# Patient Record
Sex: FEMALE | Race: WHITE | Employment: FULL TIME | URBAN - METROPOLITAN AREA
[De-identification: names, ages, dates, MRNs, and addresses within clinical notes are randomized per-mention and may not be internally consistent; named-entity substitution may affect disease eponyms.]

---

## 2016-12-06 LAB
HBSAG, EXTERNAL: NORMAL
HIV, EXTERNAL: NORMAL
RPR, EXTERNAL: NORMAL
RUBELLA, EXTERNAL: NORMAL
TYPE, ABO & RH, EXTERNAL: NORMAL

## 2017-06-15 LAB — GRBS, EXTERNAL: NEGATIVE

## 2017-06-22 ENCOUNTER — HOSPITAL ENCOUNTER (OUTPATIENT)
Age: 32
Discharge: HOME | End: 2017-06-22
Attending: OBSTETRICS & GYNECOLOGY | Admitting: OBSTETRICS & GYNECOLOGY
Payer: COMMERCIAL

## 2017-06-22 VITALS — SYSTOLIC BLOOD PRESSURE: 107 MMHG | DIASTOLIC BLOOD PRESSURE: 69 MMHG | TEMPERATURE: 98.5 F | HEART RATE: 77 BPM

## 2017-06-22 PROCEDURE — 74011250636 HC RX REV CODE- 250/636: Performed by: OBSTETRICS & GYNECOLOGY

## 2017-06-22 PROCEDURE — 77030020255 HC SOL INJ LR 1000ML BG

## 2017-06-22 PROCEDURE — 59412 ANTEPARTUM MANIPULATION: CPT

## 2017-06-22 PROCEDURE — 96360 HYDRATION IV INFUSION INIT: CPT

## 2017-06-22 PROCEDURE — 99285 EMERGENCY DEPT VISIT HI MDM: CPT

## 2017-06-22 PROCEDURE — 76815 OB US LIMITED FETUS(S): CPT

## 2017-06-22 PROCEDURE — 59025 FETAL NON-STRESS TEST: CPT

## 2017-06-22 PROCEDURE — 96361 HYDRATE IV INFUSION ADD-ON: CPT

## 2017-06-22 RX ORDER — SODIUM CHLORIDE, SODIUM LACTATE, POTASSIUM CHLORIDE, CALCIUM CHLORIDE 600; 310; 30; 20 MG/100ML; MG/100ML; MG/100ML; MG/100ML
125 INJECTION, SOLUTION INTRAVENOUS CONTINUOUS
Status: DISCONTINUED | OUTPATIENT
Start: 2017-06-22 | End: 2017-06-22 | Stop reason: HOSPADM

## 2017-06-22 RX ADMIN — SODIUM CHLORIDE, SODIUM LACTATE, POTASSIUM CHLORIDE, AND CALCIUM CHLORIDE 125 ML/HR: 600; 310; 30; 20 INJECTION, SOLUTION INTRAVENOUS at 07:40

## 2017-06-22 NOTE — PROGRESS NOTES
0715 Received  43weeeks white female for scheduled version for breech presentation,monitors applied,reassuring fhr noted . [facility] [place] in for ultrasound to confirm breech presentation. 6721 Monitors off for [id number] Time out done with Dr Edil Feldman. JENAE Zaidi rn.[id number] unsuccessfulversion x2 per Dr Edil Feldman. 0850 reassuring fhr 145 0910 Reactive fhr noted baseline 150 accels 170,no contractions noted. 0269 Monitors off , written discharge instructions given , patient/ voiced understanding. Gave tour of unit with entire csection  Plan off care. Instructed to arrive on unit 2hours prior to procedure. .,discharge to home via ambulatory.

## 2017-06-22 NOTE — IP AVS SNAPSHOT
Summary of Care Report The Summary of Care report has been created to help improve care coordination. Users with access to [employer] or [address] (Web-based application) may access additional patient information including the Discharge Summary. If you are not currently a [address] user and need more information, please call the number listed below in the [place] [address] section and ask to be connected with Medical Records. Facility Information Name Address Phone [facility] [address] [facility] [address] [id number] [phone] Patient Information Patient Name Sex  Xiomara Pierce ([id number]) Female 1985 Discharge Information Admitting Provider Service Area Unit Deniz Jones MD / Sarah Mcmillan 92 3 Labor & Delivery / [phone] Discharge Provider Discharge Date/Time Discharge Disposition Destination (none) 2017 (Pending) AHR (none) Hospital Problems as of 2017  Never Reviewed Class Noted - Resolved Last Modified POA Active Problems * (Principal)Breech presentation  2017 - Present 2017 by Deniz Jones MD Yes Entered by Deniz Jones MD  
  
You are allergic to the following Allergen Reactions Ceclor (Cefaclor) Rash Current Discharge Medication List  
  
Notice You have not been prescribed any medications. Follow-up Information None Discharge Instructions Turning a Breech Baby: Care Instructions Your Care Instructions At the end of a pregnancy, most babies have their head near the birth canal (vagina). But sometimes a baby's rear end or feet are near the birth canal. This position is called breech. If your baby stays in this breech position, you will probably need a  section ().  Most breech babies are healthy and don't have problems after birth. Your doctor may try to turn your baby. To do this, the doctor presses on certain places on your belly. Sometimes this causes the baby to turn. The medical name for this process is external cephalic version. If your baby turns, your doctor may send you home. But he or she will check you often until your labor begins. If your baby's head stays down, you may be able to have a vaginal delivery. But a small number of babies move back into a breech position. During the process of trying to turn your baby, your doctor will carefully watch your uterus. It's possible that the pressure and movement might start contractions. It's also possible that the umbilical cord will twist or get damaged. Follow-up care is a key part of your treatment and safety. Be sure to make and go to all appointments, and call your doctor if you are having problems. It's also a good idea to know your test results and keep a list of the medicines you take. What happens during an external cephalic version? · Your doctor will give you medicine to relax your uterus. Your doctor may give you medicine for pain and to help you relax. · Your doctor will put both hands on your belly. One hand will be near the baby's head. The other hand will be near the baby's rear end. The doctor will push and roll the baby to try to get the head down. · You may feel some pain. The doctor will ask you how you are doing. · Your doctor will use a heart monitor to see how your baby is doing. · After the process, your doctor will give you instructions for your care. Why might you choose to have your baby turned? · You would like to have a vaginal delivery, if possible. · You are 36 or more weeks pregnant with one baby. · Your baby has not dropped into your pelvis. It's easier to move a baby that has not dropped. · Ultrasound shows that you have plenty of amniotic fluid around your baby.  
What are the risks if your doctor tries to turn your baby? · You will feel pressure or pain when the doctor presses on your belly. · You may need an emergency  section. · The process may cause you to start contractions. In rare cases it can cause the water to break. · This process may not work. When should you call for help? Call 911 anytime you think you may need emergency care. For example, call if: 
· You passed out (lost consciousness). · You have severe vaginal bleeding. · You have severe pain in your belly or pelvis. Call your doctor now or seek immediate medical care if: 
· You have any vaginal bleeding. · You have belly pain. · You think that you are in labor. · You have a sudden release of fluid from your vagina. · You notice that your baby has stopped moving or is moving much less than normal. 
Watch closely for changes in your health, and be sure to contact your doctor if you have any questions or concerns. Where can you learn more? Go to http://esteban-ruthy.info/. Enter B837 in the search box to learn more about \"Turning a Breech Baby: Care Instructions. \" Current as of: 2017 Content Version: 11.3 © 3131-2471 [employer]ult. Care instructions adapted under license by [employer] (which disclaims liability or warranty for this information). If you have questions about a medical condition or this instruction, always ask your healthcare professional. [place] [address] any warranty or liability for your use of this information. Breech Birth: Care Instructions Your Care Instructions During most of your pregnancy, your baby has plenty of room to move around. Close to birth, there is not much room left. As birth gets close, most babies settle into a head-down position. When a baby's rear end (buttocks) or feet are down near the birth canal (vagina), it is called a breech position. Most breech babies are healthy. Most don't have problems after birth.  
You probably can't tell that your baby is breech. Your doctor may have told you about your baby's position during a visit. You may have had an ultrasound test to show that your baby is breech. Your doctor may give you exercises to do at home. These may help move your baby into the right position. If they don't, your doctor may try to turn your baby. Your doctor will use his or her hands to press certain parts of your belly. This often can work to move the baby. Before and after, you will have a test to make sure that your baby's heart is beating as it should. If your baby turns the right way, your doctor will check you often. This is to make sure that the baby stays head-down until labor starts. You may then be able to have a vaginal delivery. If your baby is breech when your labor starts, you are likely to have surgery to deliver the baby. This is called a  section (). While some breech babies are delivered through a vaginal birth, this may slightly increase health risks to the baby and the mother. Discuss the risks and benefits of a vaginal breech delivery with your doctor. Follow-up care is a key part of your treatment and safety. Be sure to make and go to all appointments, and call your doctor if you are having problems. It's also a good idea to know your test results and keep a list of the medicines you take. How can you care for yourself at home? · Have regular checkups all through your pregnancy. This will help you know your baby's position before you go into labor. · Ask your doctor about special exercises that may help to turn your baby into the normal birth position. If your doctor recommends these exercises, do them as your doctor tells you to. When should you call for help? Call your doctor now or seek immediate medical care if: 
· You think that you are in labor.  
Watch closely for changes in your health, and be sure to contact your doctor if you have any other questions or concerns. Where can you learn more? Go to http://esteban-ruthy.info/. Merline Mancilla in the search box to learn more about \"Breech Birth: Care Instructions. \" Current as of: 2017 Content Version: 11.3 © 0105-4866 [employer]ADS. Care instructions adapted under license by [employer] (which disclaims liability or warranty for this information). If you have questions about a medical condition or this instruction, always ask your healthcare professional. [address] any warranty or liability for your use of this information. Breech Birth: Care Instructions Your Care Instructions During most of your pregnancy, your baby has plenty of room to move around. Close to birth, there is not much room left. As birth gets close, most babies settle into a head-down position. When a baby's rear end (buttocks) or feet are down near the birth canal (vagina), it is called a breech position. Most breech babies are healthy. Most don't have problems after birth. You probably can't tell that your baby is breech. Your doctor may have told you about your baby's position during a visit. You may have had an ultrasound test to show that your baby is breech. Your doctor may give you exercises to do at home. These may help move your baby into the right position. If they don't, your doctor may try to turn your baby. Your doctor will use his or her hands to press certain parts of your belly. This often can work to move the baby. Before and after, you will have a test to make sure that your baby's heart is beating as it should. If your baby turns the right way, your doctor will check you often. This is to make sure that the baby stays head-down until labor starts. You may then be able to have a vaginal delivery. If your baby is breech when your labor starts, you are likely to have surgery to deliver the baby. This is called a  section ().  While some breech babies are delivered through a vaginal birth, this may slightly increase health risks to the baby and the mother. Discuss the risks and benefits of a vaginal breech delivery with your doctor. Follow-up care is a key part of your treatment and safety. Be sure to make and go to all appointments, and call your doctor if you are having problems. It's also a good idea to know your test results and keep a list of the medicines you take. How can you care for yourself at home? · Have regular checkups all through your pregnancy. This will help you know your baby's position before you go into labor. · Ask your doctor about special exercises that may help to turn your baby into the normal birth position. If your doctor recommends these exercises, do them as your doctor tells you to. When should you call for help? Call your doctor now or seek immediate medical care if: 
· You think that you are in labor. Watch closely for changes in your health, and be sure to contact your doctor if you have any other questions or concerns. Where can you learn more? Go to http://estebanEXFOruthy.info/. Jose Cortes in the search box to learn more about \"Breech Birth: Care Instructions. \" Current as of: March 16, 2017 Content Version: 11.3 © 5162-5374 NaHere. Care instructions adapted under license by [employer] (which disclaims liability or warranty for this information). If you have questions about a medical condition or this instruction, always ask your healthcare professional. [place] [address] any warranty or liability for your use of this information. Breech Birth: Care Instructions Your Care Instructions During most of your pregnancy, your baby has plenty of room to move around. Close to birth, there is not much room left. As birth gets close, most babies settle into a head-down position.  When a baby's rear end (buttocks) or feet are down near the birth canal (vagina), it is called a breech position. Most breech babies are healthy. Most don't have problems after birth. You probably can't tell that your baby is breech. Your doctor may have told you about your baby's position during a visit. You may have had an ultrasound test to show that your baby is breech. Your doctor may give you exercises to do at home. These may help move your baby into the right position. If they don't, your doctor may try to turn your baby. Your doctor will use his or her hands to press certain parts of your belly. This often can work to move the baby. Before and after, you will have a test to make sure that your baby's heart is beating as it should. If your baby turns the right way, your doctor will check you often. This is to make sure that the baby stays head-down until labor starts. You may then be able to have a vaginal delivery. If your baby is breech when your labor starts, you are likely to have surgery to deliver the baby. This is called a  section (). While some breech babies are delivered through a vaginal birth, this may slightly increase health risks to the baby and the mother. Discuss the risks and benefits of a vaginal breech delivery with your doctor. Follow-up care is a key part of your treatment and safety. Be sure to make and go to all appointments, and call your doctor if you are having problems. It's also a good idea to know your test results and keep a list of the medicines you take. How can you care for yourself at home? · Have regular checkups all through your pregnancy. This will help you know your baby's position before you go into labor. · Ask your doctor about special exercises that may help to turn your baby into the normal birth position. If your doctor recommends these exercises, do them as your doctor tells you to. When should you call for help?  
Call your doctor now or seek immediate medical care if: 
· You think that you are in labor. Watch closely for changes in your health, and be sure to contact your doctor if you have any other questions or concerns. Where can you learn more? Go to http://esteban-ruthy.info/. Jesika Brochure in the search box to learn more about \"Breech Birth: Care Instructions. \" Current as of: 2017 Content Version: 11.3 © 5519-7111 Graphdive. Care instructions adapted under license by [employer] (which disclaims liability or warranty for this information). If you have questions about a medical condition or this instruction, always ask your healthcare professional. [address] any warranty or liability for your use of this information. Turning a Breech Baby: Care Instructions Your Care Instructions At the end of a pregnancy, most babies have their head near the birth canal (vagina). But sometimes a baby's rear end or feet are near the birth canal. This position is called breech. If your baby stays in this breech position, you will probably need a  section (). Most breech babies are healthy and don't have problems after birth. Your doctor may try to turn your baby. To do this, the doctor presses on certain places on your belly. Sometimes this causes the baby to turn. The medical name for this process is external cephalic version. If your baby turns, your doctor may send you home. But he or she will check you often until your labor begins. If your baby's head stays down, you may be able to have a vaginal delivery. But a small number of babies move back into a breech position. During the process of trying to turn your baby, your doctor will carefully watch your uterus. It's possible that the pressure and movement might start contractions. It's also possible that the umbilical cord will twist or get damaged. Follow-up care is a key part of your treatment and safety.  Be sure to make and go to all appointments, and call your doctor if you are having problems. It's also a good idea to know your test results and keep a list of the medicines you take. What happens during an external cephalic version? · Your doctor will give you medicine to relax your uterus. Your doctor may give you medicine for pain and to help you relax. · Your doctor will put both hands on your belly. One hand will be near the baby's head. The other hand will be near the baby's rear end. The doctor will push and roll the baby to try to get the head down. · You may feel some pain. The doctor will ask you how you are doing. · Your doctor will use a heart monitor to see how your baby is doing. · After the process, your doctor will give you instructions for your care. Why might you choose to have your baby turned? · You would like to have a vaginal delivery, if possible. · You are 36 or more weeks pregnant with one baby. · Your baby has not dropped into your pelvis. It's easier to move a baby that has not dropped. · Ultrasound shows that you have plenty of amniotic fluid around your baby. What are the risks if your doctor tries to turn your baby? · You will feel pressure or pain when the doctor presses on your belly. · You may need an emergency  section. · The process may cause you to start contractions. In rare cases it can cause the water to break. · This process may not work. When should you call for help? Call 911 anytime you think you may need emergency care. For example, call if: 
· You passed out (lost consciousness). · You have severe vaginal bleeding. · You have severe pain in your belly or pelvis. Call your doctor now or seek immediate medical care if: 
· You have any vaginal bleeding. · You have belly pain. · You think that you are in labor. · You have a sudden release of fluid from your vagina.  
· You notice that your baby has stopped moving or is moving much less than normal. 
Watch closely for changes in your health, and be sure to contact your doctor if you have any questions or concerns. Where can you learn more? Go to http://esteban-ruthy.info/. Enter G925 in the search box to learn more about \"Turning a Breech Baby: Care Instructions. \" Current as of: March 16, 2017 Content Version: 11.3 © 5898-6826 [employer]. Care instructions adapted under license by e(ye)BRAIN (which disclaims liability or warranty for this information). If you have questions about a medical condition or this instruction, always ask your healthcare professional. [place] [address] any warranty or liability for your use of this information. Chart Review Routing History No Routing History on File

## 2017-06-22 NOTE — IP AVS SNAPSHOT
[address] Patient: Sanaz Velasco MRN: [id number] UEM: You are allergic to the following Allergen Reactions Ceclor (Cefaclor) Rash Recent Documentation OB Status Pregnant About your hospitalization You were admitted on:  2017 You last received care in the:  [address] [facility] [address] You were discharged on:  2017 Unit phone number:  [phone] Why you were hospitalized Your primary diagnosis was:  Breech Presentation Providers Seen During Your Hospitalizations Provider Role Specialty Primary office phone Deniz Jones MD Attending Provider Obstetrics & Gynecology [phone] Your Primary Care Physician (PCP) ** None ** Follow-up Information None Current Discharge Medication List  
  
Notice You have not been prescribed any medications. Discharge Instructions Turning a Breech Baby: Care Instructions Your Care Instructions At the end of a pregnancy, most babies have their head near the birth canal (vagina). But sometimes a baby's rear end or feet are near the birth canal. This position is called breech. If your baby stays in this breech position, you will probably need a  section (). Most breech babies are healthy and don't have problems after birth. Your doctor may try to turn your baby. To do this, the doctor presses on certain places on your belly. Sometimes this causes the baby to turn. The medical name for this process is external cephalic version. If your baby turns, your doctor may send you home. But he or she will check you often until your labor begins. If your baby's head stays down, you may be able to have a vaginal delivery. But a small number of babies move back into a breech position.  
During the process of trying to turn your baby, your doctor will carefully watch your uterus. It's possible that the pressure and movement might start contractions. It's also possible that the umbilical cord will twist or get damaged. Follow-up care is a key part of your treatment and safety. Be sure to make and go to all appointments, and call your doctor if you are having problems. It's also a good idea to know your test results and keep a list of the medicines you take. What happens during an external cephalic version? · Your doctor will give you medicine to relax your uterus. Your doctor may give you medicine for pain and to help you relax. · Your doctor will put both hands on your belly. One hand will be near the baby's head. The other hand will be near the baby's rear end. The doctor will push and roll the baby to try to get the head down. · You may feel some pain. The doctor will ask you how you are doing. · Your doctor will use a heart monitor to see how your baby is doing. · After the process, your doctor will give you instructions for your care. Why might you choose to have your baby turned? · You would like to have a vaginal delivery, if possible. · You are 36 or more weeks pregnant with one baby. · Your baby has not dropped into your pelvis. It's easier to move a baby that has not dropped. · Ultrasound shows that you have plenty of amniotic fluid around your baby. What are the risks if your doctor tries to turn your baby? · You will feel pressure or pain when the doctor presses on your belly. · You may need an emergency  section. · The process may cause you to start contractions. In rare cases it can cause the water to break. · This process may not work. When should you call for help? Call 911 anytime you think you may need emergency care. For example, call if: 
· You passed out (lost consciousness). · You have severe vaginal bleeding. · You have severe pain in your belly or pelvis.  
Call your doctor now or seek immediate medical care if: 
· You have any vaginal bleeding. · You have belly pain. · You think that you are in labor. · You have a sudden release of fluid from your vagina. · You notice that your baby has stopped moving or is moving much less than normal. 
Watch closely for changes in your health, and be sure to contact your doctor if you have any questions or concerns. Where can you learn more? Go to http://esteban-ruthy.info/. Enter K704 in the search box to learn more about \"Turning a Breech Baby: Care Instructions. \" Current as of: March 16, 2017 Content Version: 11.3 © 8105-3707 [employer]. Care instructions adapted under license by [employer] (which disclaims liability or warranty for this information). If you have questions about a medical condition or this instruction, always ask your healthcare professional. [place] [address] any warranty or liability for your use of this information. Breech Birth: Care Instructions Your Care Instructions During most of your pregnancy, your baby has plenty of room to move around. Close to birth, there is not much room left. As birth gets close, most babies settle into a head-down position. When a baby's rear end (buttocks) or feet are down near the birth canal (vagina), it is called a breech position. Most breech babies are healthy. Most don't have problems after birth. You probably can't tell that your baby is breech. Your doctor may have told you about your baby's position during a visit. You may have had an ultrasound test to show that your baby is breech. Your doctor may give you exercises to do at home. These may help move your baby into the right position. If they don't, your doctor may try to turn your baby. Your doctor will use his or her hands to press certain parts of your belly. This often can work to move the baby.  Before and after, you will have a test to make sure that your baby's heart is beating as it should. If your baby turns the right way, your doctor will check you often. This is to make sure that the baby stays head-down until labor starts. You may then be able to have a vaginal delivery. If your baby is breech when your labor starts, you are likely to have surgery to deliver the baby. This is called a  section (). While some breech babies are delivered through a vaginal birth, this may slightly increase health risks to the baby and the mother. Discuss the risks and benefits of a vaginal breech delivery with your doctor. Follow-up care is a key part of your treatment and safety. Be sure to make and go to all appointments, and call your doctor if you are having problems. It's also a good idea to know your test results and keep a list of the medicines you take. How can you care for yourself at home? · Have regular checkups all through your pregnancy. This will help you know your baby's position before you go into labor. · Ask your doctor about special exercises that may help to turn your baby into the normal birth position. If your doctor recommends these exercises, do them as your doctor tells you to. When should you call for help? Call your doctor now or seek immediate medical care if: 
· You think that you are in labor. Watch closely for changes in your health, and be sure to contact your doctor if you have any other questions or concerns. Where can you learn more? Go to http://esteban-ruthy.info/. Svitlana Langston in the search box to learn more about \"Breech Birth: Care Instructions. \" Current as of: 2017 Content Version: 11.3 © 4671-2507 [employer]. Care instructions adapted under license by [employer] (which disclaims liability or warranty for this information).  If you have questions about a medical condition or this instruction, always ask your healthcare professional. [employer], [facility] disclaims any warranty or liability for your use of this information. Breech Birth: Care Instructions Your Care Instructions During most of your pregnancy, your baby has plenty of room to move around. Close to birth, there is not much room left. As birth gets close, most babies settle into a head-down position. When a baby's rear end (buttocks) or feet are down near the birth canal (vagina), it is called a breech position. Most breech babies are healthy. Most don't have problems after birth. You probably can't tell that your baby is breech. Your doctor may have told you about your baby's position during a visit. You may have had an ultrasound test to show that your baby is breech. Your doctor may give you exercises to do at home. These may help move your baby into the right position. If they don't, your doctor may try to turn your baby. Your doctor will use his or her hands to press certain parts of your belly. This often can work to move the baby. Before and after, you will have a test to make sure that your baby's heart is beating as it should. If your baby turns the right way, your doctor will check you often. This is to make sure that the baby stays head-down until labor starts. You may then be able to have a vaginal delivery. If your baby is breech when your labor starts, you are likely to have surgery to deliver the baby. This is called a  section (). While some breech babies are delivered through a vaginal birth, this may slightly increase health risks to the baby and the mother. Discuss the risks and benefits of a vaginal breech delivery with your doctor. Follow-up care is a key part of your treatment and safety. Be sure to make and go to all appointments, and call your doctor if you are having problems. It's also a good idea to know your test results and keep a list of the medicines you take. How can you care for yourself at home?  
· Have regular checkups all through your pregnancy. This will help you know your baby's position before you go into labor. · Ask your doctor about special exercises that may help to turn your baby into the normal birth position. If your doctor recommends these exercises, do them as your doctor tells you to. When should you call for help? Call your doctor now or seek immediate medical care if: 
· You think that you are in labor. Watch closely for changes in your health, and be sure to contact your doctor if you have any other questions or concerns. Where can you learn more? Go to http://esteban-ruthy.info/. Marian Green in the search box to learn more about \"Breech Birth: Care Instructions. \" Current as of: March 16, 2017 Content Version: 11.3 © 0324-4190 [employer]. Care instructions adapted under license by [employer] (which disclaims liability or warranty for this information). If you have questions about a medical condition or this instruction, always ask your healthcare professional. [place] [address] any warranty or liability for your use of this information. Breech Birth: Care Instructions Your Care Instructions During most of your pregnancy, your baby has plenty of room to move around. Close to birth, there is not much room left. As birth gets close, most babies settle into a head-down position. When a baby's rear end (buttocks) or feet are down near the birth canal (vagina), it is called a breech position. Most breech babies are healthy. Most don't have problems after birth. You probably can't tell that your baby is breech. Your doctor may have told you about your baby's position during a visit. You may have had an ultrasound test to show that your baby is breech. Your doctor may give you exercises to do at home. These may help move your baby into the right position. If they don't, your doctor may try to turn your baby.  Your doctor will use his or her hands to press certain parts of your belly. This often can work to move the baby. Before and after, you will have a test to make sure that your baby's heart is beating as it should. If your baby turns the right way, your doctor will check you often. This is to make sure that the baby stays head-down until labor starts. You may then be able to have a vaginal delivery. If your baby is breech when your labor starts, you are likely to have surgery to deliver the baby. This is called a  section (). While some breech babies are delivered through a vaginal birth, this may slightly increase health risks to the baby and the mother. Discuss the risks and benefits of a vaginal breech delivery with your doctor. Follow-up care is a key part of your treatment and safety. Be sure to make and go to all appointments, and call your doctor if you are having problems. It's also a good idea to know your test results and keep a list of the medicines you take. How can you care for yourself at home? · Have regular checkups all through your pregnancy. This will help you know your baby's position before you go into labor. · Ask your doctor about special exercises that may help to turn your baby into the normal birth position. If your doctor recommends these exercises, do them as your doctor tells you to. When should you call for help? Call your doctor now or seek immediate medical care if: 
· You think that you are in labor. Watch closely for changes in your health, and be sure to contact your doctor if you have any other questions or concerns. Where can you learn more? Go to http://esteban-ruthy.info/. Debbie Patrick in the search box to learn more about \"Breech Birth: Care Instructions. \" Current as of: 2017 Content Version: 11.3 © 5802-3658 [employer].  Care instructions adapted under license by [employer] (which disclaims liability or warranty for this information). If you have questions about a medical condition or this instruction, always ask your healthcare professional. [address] any warranty or liability for your use of this information. Turning a Breech Baby: Care Instructions Your Care Instructions At the end of a pregnancy, most babies have their head near the birth canal (vagina). But sometimes a baby's rear end or feet are near the birth canal. This position is called breech. If your baby stays in this breech position, you will probably need a  section (). Most breech babies are healthy and don't have problems after birth. Your doctor may try to turn your baby. To do this, the doctor presses on certain places on your belly. Sometimes this causes the baby to turn. The medical name for this process is external cephalic version. If your baby turns, your doctor may send you home. But he or she will check you often until your labor begins. If your baby's head stays down, you may be able to have a vaginal delivery. But a small number of babies move back into a breech position. During the process of trying to turn your baby, your doctor will carefully watch your uterus. It's possible that the pressure and movement might start contractions. It's also possible that the umbilical cord will twist or get damaged. Follow-up care is a key part of your treatment and safety. Be sure to make and go to all appointments, and call your doctor if you are having problems. It's also a good idea to know your test results and keep a list of the medicines you take. What happens during an external cephalic version? · Your doctor will give you medicine to relax your uterus. Your doctor may give you medicine for pain and to help you relax. · Your doctor will put both hands on your belly. One hand will be near the baby's head. The other hand will be near the baby's rear end.  The doctor will push and roll the baby to try to get the head down. · You may feel some pain. The doctor will ask you how you are doing. · Your doctor will use a heart monitor to see how your baby is doing. · After the process, your doctor will give you instructions for your care. Why might you choose to have your baby turned? · You would like to have a vaginal delivery, if possible. · You are 36 or more weeks pregnant with one baby. · Your baby has not dropped into your pelvis. It's easier to move a baby that has not dropped. · Ultrasound shows that you have plenty of amniotic fluid around your baby. What are the risks if your doctor tries to turn your baby? · You will feel pressure or pain when the doctor presses on your belly. · You may need an emergency  section. · The process may cause you to start contractions. In rare cases it can cause the water to break. · This process may not work. When should you call for help? Call 911 anytime you think you may need emergency care. For example, call if: 
· You passed out (lost consciousness). · You have severe vaginal bleeding. · You have severe pain in your belly or pelvis. Call your doctor now or seek immediate medical care if: 
· You have any vaginal bleeding. · You have belly pain. · You think that you are in labor. · You have a sudden release of fluid from your vagina. · You notice that your baby has stopped moving or is moving much less than normal. 
Watch closely for changes in your health, and be sure to contact your doctor if you have any questions or concerns. Where can you learn more? Go to http://esteban-ruthy.info/. Enter A937 in the search box to learn more about \"Turning a Breech Baby: Care Instructions. \" Current as of: 2017 Content Version: 11.3 © 2632-4154 [employer]. Care instructions adapted under license by [employer] (which disclaims liability or warranty for this information).  If you have questions about a medical condition or this instruction, always ask your healthcare professional. [address] any warranty or liability for your use of this information. Discharge Instructions Attachments/References  SECTION: PRE-OP (ENGLISH) Discharge Orders None Sportube Announcement We are excited to announce that we are making your provider's discharge notes available to you in Sportube. You will see these notes when they are completed and signed by the physician that discharged you from your recent hospital stay. If you have any questions or concerns about any information you see in Sportube, please call the Health Information Department where you were seen or reach out to your Primary Care Provider for more information about your plan of care. Introducing [facility]! [facility] introduces Sportube patient portal. Now you can access parts of your medical record, email your doctor's office, and request medication refills online. 1. In your internet browser, go to [email or web address] 2. Click on the First Time User? Click Here link in the Sign In box. You will see the New Member Sign Up page. 3. Enter your Sportube Access Code exactly as it appears below. You will not need to use this code after youve completed the sign-up process. If you do not sign up before the expiration date, you must request a new code. · Sportube Access Code: [email or web address] Expires: 2017  9:14 AM 
 
4. Enter the last four digits of your Social Security Number (xxxx) and Date of Birth (mm/dd/yyyy) as indicated and click Submit. You will be taken to the next sign-up page. 5. Create a Sentimentt ID. This will be your Sportube login ID and cannot be changed, so think of one that is secure and easy to remember. 6. Create a Sportube password. You can change your password at any time. 7. Enter your Password Reset Question and Answer.  This can be used at a later time if you forget your password. 8. Enter your e-mail address. You will receive e-mail notification when new information is available in [address] 9. Click Sign Up. You can now view and download portions of your medical record. 10. Click the Download Summary menu link to download a portable copy of your medical information. If you have questions, please visit the Frequently Asked Questions section of the PeekYou website. Remember, PeekYou is NOT to be used for urgent needs. For medical emergencies, dial 911. Now available from your iPhone and Android! General Information Please provide this summary of care documentation to your next provider. Patient Signature:  ____________________________________________________________ Date:  ____________________________________________________________  
  
Rosie Lighttracy Provider Signature:  ____________________________________________________________ Date:  ____________________________________________________________ More Information  Section: Before Your Surgery What is a  section? A  section, or , is surgery to deliver your baby through a cut the doctor makes in your lower belly and uterus. This cut is also called an incision. In many cases, the doctor makes the cut just above the pubic hairline. In other cases, it runs from the belly button to the pubic hairline. Both cuts leave a scar. It most often fades with time. The surgery may be done while you are awake but your belly is numb. This lets you be awake for the birth of your baby. Less often, women need general anesthesia. This means you are asleep during the surgery. Most women go home about 3 days after the birth. You may feel better each day. But you will likely need about 6 weeks to fully recover. During the first few weeks you will need extra help with household chores.  But you will be able to care for your baby. You can do things like breastfeed and change diapers. Follow-up care is a key part of your treatment and safety. Be sure to make and go to all appointments, and call your doctor if you are having problems. It's also a good idea to know your test results and keep a list of the medicines you take. What happens before surgery? Surgery can be stressful. This information will help you understand what you can expect. And it will help you safely prepare for surgery. Preparing for surgery · Understand exactly what surgery is planned, along with the risks, benefits, and other options. · Tell your doctors ALL the medicines, vitamins, supplements, and herbal remedies you take. Some of these can increase the risk of bleeding or interact with anesthesia. · If you take blood thinners, be sure to talk to your doctor. He or she will tell you if you should stop taking these medicines before your surgery. Make sure that you understand exactly what your doctor wants you to do. · Your doctor will tell you which medicines to take or stop before your surgery. You may need to stop taking certain medicines a week or more before surgery, so talk to your doctor as soon as you can. · If you have an advance directive, let your doctor know. It may include a living will and a durable power of  for health care. Bring a copy to the hospital. If you don't have one, you may want to prepare one. It lets your doctor and loved ones know your health care wishes. Doctors advise that everyone prepare these papers before any type of surgery or procedure. What happens on the day of surgery? · Follow the instructions exactly about when to stop eating and drinking. If you don't, your surgery may be canceled. If your doctor told you to take your medicines on the day of surgery, take them with only a sip of water. · Take a bath or shower before you come in for your surgery.  Do not apply lotions, perfumes, deodorants, or nail polish. · Do not shave the surgical site yourself. · Take off all jewelry and piercings. And take out contact lenses, if you wear them. At the hospital or surgery center · Bring a picture ID. · You will be kept comfortable and safe by your anesthesia provider. The anesthesia may make you sleep. Or it may just numb the area being worked on. · The surgery will take about 1 hour. Going home · Be sure you have someone to drive you home. Anesthesia and pain medicine make it unsafe for you to drive. · You will be given more specific instructions about recovering from your surgery. They will cover things like diet, wound care, follow-up care, driving, and getting back to your normal routine. When should you call your doctor? · You have questions or concerns. · You don't understand how to prepare for your surgery. · You become ill before the surgery (such as fever, flu, or a cold). · You need to reschedule or have changed your mind about having the surgery. Where can you learn more? Go to http://esteban-ruthy.info/. Enter F523 in the search box to learn more about \" Section: Before Your Surgery. \" Current as of: 2017 Content Version: 11.3 © 1117-4597 [employer]. Care instructions adapted under license by [employer] (which disclaims liability or warranty for this information). If you have questions about a medical condition or this instruction, always ask your healthcare professional. Sonya [place] [address] any warranty or liability for your use of this information.

## 2017-06-22 NOTE — OP NOTES
External Cephalic Version Procedure    Patient: Maria Guadalupe Do MRN: [id number]  SSN: [id number]    YOB: 1985  Age: 29 y.o. Sex: female      Diagnosis: 650     Patient Active Problem List   Diagnosis Code    Breech presentation O27. 1XX0         Юлия Constantino is a 29 y.o.  female who is Not found. who is being admitted for external cephalic version. Admitted to 3414/01. We have already discussed the risks, benefits, and alternatives in my office and I have given them written information on the same. I also repeated some of this information and answered all her questions. Allergies   Allergen Reactions    Ceclor [Cefaclor] Rash     Breech presentation  OB History    Para Term  AB Living   1        SAB TAB Ectopic Multiple Live Births             # Outcome Date GA Lbr Mane/2nd Weight Sex Delivery Anes PTL Lv   1 Current                 Past Medical History:   Diagnosis Date    Breech presentation 2017     No past surgical history on file. No family history on file. Social History     Social History    Marital status: N/A     Spouse name: N/A    Number of children: N/A    Years of education: N/A     Occupational History    Not on file. Social History Main Topics    Smoking status: Not on file    Smokeless tobacco: Not on file    Alcohol use Not on file    Drug use: Not on file    Sexual activity: Not on file     Other Topics Concern    Not on file     Social History Narrative    No narrative on file       Objective:  Visit Vitals    /69    Pulse 77    Temp 98.5 °F (36.9 °C)       The patient was known to be breech recently in the office, and was told the benefits and risks to external version and an information packet was previously given to her. A non stress test test was done and was reactive with 15 BPM more than twice in ten minutes. The variability was good  and no worrisome decelerations were noted.   The plan is for external version with this reactive test (12739-48). An ultrasound was done to confirm breech presentation, adequate fluid and placental location. Fetal motion and breathing as well as tone were noted (46811-55). Under ultrasound guidance  A forward flip was first attempted. A back flip was also attempted if the baby did not easily turn forward repeated attempts were done until the fetus did not turn (62430). A repeat ultrasound was done following the procedure to confirm fetal wellbeing with motion, fluid, tone and breathing as well as reconfirm the position (90014-28-93). The heart rate was monitored after for reactivty and was reactive with 15 BPM for 15 seconds atleast twice in 20 minutes before releasing the patient (14268-50-61). She will follow-up in the office within an week for confirmation of presentation and scheduling of  if still needed.     Deniz Jones MD  2017  8:33 AM

## 2017-06-22 NOTE — DISCHARGE INSTRUCTIONS
Turning a Breech Baby: Care Instructions  Your Care Instructions    At the end of a pregnancy, most babies have their head near the birth canal (vagina). But sometimes a baby's rear end or feet are near the birth canal. This position is called breech. If your baby stays in this breech position, you will probably need a  section (). Most breech babies are healthy and don't have problems after birth. Your doctor may try to turn your baby. To do this, the doctor presses on certain places on your belly. Sometimes this causes the baby to turn. The medical name for this process is external cephalic version. If your baby turns, your doctor may send you home. But he or she will check you often until your labor begins. If your baby's head stays down, you may be able to have a vaginal delivery. But a small number of babies move back into a breech position. During the process of trying to turn your baby, your doctor will carefully watch your uterus. It's possible that the pressure and movement might start contractions. It's also possible that the umbilical cord will twist or get damaged. Follow-up care is a key part of your treatment and safety. Be sure to make and go to all appointments, and call your doctor if you are having problems. It's also a good idea to know your test results and keep a list of the medicines you take. What happens during an external cephalic version? · Your doctor will give you medicine to relax your uterus. Your doctor may give you medicine for pain and to help you relax. · Your doctor will put both hands on your belly. One hand will be near the baby's head. The other hand will be near the baby's rear end. The doctor will push and roll the baby to try to get the head down. · You may feel some pain. The doctor will ask you how you are doing. · Your doctor will use a heart monitor to see how your baby is doing.   · After the process, your doctor will give you instructions for your care. Why might you choose to have your baby turned? · You would like to have a vaginal delivery, if possible. · You are 36 or more weeks pregnant with one baby. · Your baby has not dropped into your pelvis. It's easier to move a baby that has not dropped. · Ultrasound shows that you have plenty of amniotic fluid around your baby. What are the risks if your doctor tries to turn your baby? · You will feel pressure or pain when the doctor presses on your belly. · You may need an emergency  section. · The process may cause you to start contractions. In rare cases it can cause the water to break. · This process may not work. When should you call for help? Call 911 anytime you think you may need emergency care. For example, call if:  · You passed out (lost consciousness). · You have severe vaginal bleeding. · You have severe pain in your belly or pelvis. Call your doctor now or seek immediate medical care if:  · You have any vaginal bleeding. · You have belly pain. · You think that you are in labor. · You have a sudden release of fluid from your vagina. · You notice that your baby has stopped moving or is moving much less than normal.  Watch closely for changes in your health, and be sure to contact your doctor if you have any questions or concerns. Where can you learn more? Go to http://esteban-ruthy.info/. Enter E812 in the search box to learn more about \"Turning a Breech Baby: Care Instructions. \"  Current as of: 2017  Content Version: 11.3  © 3871-8809 Health[employer], Incorporated. Care instructions adapted under license by [employer] (which disclaims liability or warranty for this information). If you have questions about a medical condition or this instruction, always ask your healthcare professional. [address] any warranty or liability for your use of this information.          Breech Birth: Care Instructions  Your Care Instructions    During most of your pregnancy, your baby has plenty of room to move around. Close to birth, there is not much room left. As birth gets close, most babies settle into a head-down position. When a baby's rear end (buttocks) or feet are down near the birth canal (vagina), it is called a breech position. Most breech babies are healthy. Most don't have problems after birth. You probably can't tell that your baby is breech. Your doctor may have told you about your baby's position during a visit. You may have had an ultrasound test to show that your baby is breech. Your doctor may give you exercises to do at home. These may help move your baby into the right position. If they don't, your doctor may try to turn your baby. Your doctor will use his or her hands to press certain parts of your belly. This often can work to move the baby. Before and after, you will have a test to make sure that your baby's heart is beating as it should. If your baby turns the right way, your doctor will check you often. This is to make sure that the baby stays head-down until labor starts. You may then be able to have a vaginal delivery. If your baby is breech when your labor starts, you are likely to have surgery to deliver the baby. This is called a  section (). While some breech babies are delivered through a vaginal birth, this may slightly increase health risks to the baby and the mother. Discuss the risks and benefits of a vaginal breech delivery with your doctor. Follow-up care is a key part of your treatment and safety. Be sure to make and go to all appointments, and call your doctor if you are having problems. It's also a good idea to know your test results and keep a list of the medicines you take. How can you care for yourself at home? · Have regular checkups all through your pregnancy. This will help you know your baby's position before you go into labor.   · Ask your doctor about special exercises that may help to turn your baby into the normal birth position. If your doctor recommends these exercises, do them as your doctor tells you to. When should you call for help? Call your doctor now or seek immediate medical care if:  · You think that you are in labor. Watch closely for changes in your health, and be sure to contact your doctor if you have any other questions or concerns. Where can you learn more? Go to http://esteban-ruthy.info/. Rashmi Moore in the search box to learn more about \"Breech Birth: Care Instructions. \"  Current as of: March 16, 2017  Content Version: 11.3  © 6337-9452 [employer]. Care instructions adapted under license by [employer] (which disclaims liability or warranty for this information). If you have questions about a medical condition or this instruction, always ask your healthcare professional. [address] any warranty or liability for your use of this information. Breech Birth: Care Instructions  Your Care Instructions    During most of your pregnancy, your baby has plenty of room to move around. Close to birth, there is not much room left. As birth gets close, most babies settle into a head-down position. When a baby's rear end (buttocks) or feet are down near the birth canal (vagina), it is called a breech position. Most breech babies are healthy. Most don't have problems after birth. You probably can't tell that your baby is breech. Your doctor may have told you about your baby's position during a visit. You may have had an ultrasound test to show that your baby is breech. Your doctor may give you exercises to do at home. These may help move your baby into the right position. If they don't, your doctor may try to turn your baby. Your doctor will use his or her hands to press certain parts of your belly. This often can work to move the baby.  Before and after, you will have a test to make sure that your baby's heart is beating as it should. If your baby turns the right way, your doctor will check you often. This is to make sure that the baby stays head-down until labor starts. You may then be able to have a vaginal delivery. If your baby is breech when your labor starts, you are likely to have surgery to deliver the baby. This is called a  section (). While some breech babies are delivered through a vaginal birth, this may slightly increase health risks to the baby and the mother. Discuss the risks and benefits of a vaginal breech delivery with your doctor. Follow-up care is a key part of your treatment and safety. Be sure to make and go to all appointments, and call your doctor if you are having problems. It's also a good idea to know your test results and keep a list of the medicines you take. How can you care for yourself at home? · Have regular checkups all through your pregnancy. This will help you know your baby's position before you go into labor. · Ask your doctor about special exercises that may help to turn your baby into the normal birth position. If your doctor recommends these exercises, do them as your doctor tells you to. When should you call for help? Call your doctor now or seek immediate medical care if:  · You think that you are in labor. Watch closely for changes in your health, and be sure to contact your doctor if you have any other questions or concerns. Where can you learn more? Go to http://esteban-ruthy.info/. Kyler Moon in the search box to learn more about \"Breech Birth: Care Instructions. \"  Current as of: 2017  Content Version: 11.3  © 9128-4216 Healthwise, Incorporated. Care instructions adapted under license by [employer] (which disclaims liability or warranty for this information).  If you have questions about a medical condition or this instruction, always ask your healthcare professional. [employer] Inc[employer] disclaims any warranty or liability for your use of this information. Breech Birth: Care Instructions  Your Care Instructions    During most of your pregnancy, your baby has plenty of room to move around. Close to birth, there is not much room left. As birth gets close, most babies settle into a head-down position. When a baby's rear end (buttocks) or feet are down near the birth canal (vagina), it is called a breech position. Most breech babies are healthy. Most don't have problems after birth. You probably can't tell that your baby is breech. Your doctor may have told you about your baby's position during a visit. You may have had an ultrasound test to show that your baby is breech. Your doctor may give you exercises to do at home. These may help move your baby into the right position. If they don't, your doctor may try to turn your baby. Your doctor will use his or her hands to press certain parts of your belly. This often can work to move the baby. Before and after, you will have a test to make sure that your baby's heart is beating as it should. If your baby turns the right way, your doctor will check you often. This is to make sure that the baby stays head-down until labor starts. You may then be able to have a vaginal delivery. If your baby is breech when your labor starts, you are likely to have surgery to deliver the baby. This is called a  section (). While some breech babies are delivered through a vaginal birth, this may slightly increase health risks to the baby and the mother. Discuss the risks and benefits of a vaginal breech delivery with your doctor. Follow-up care is a key part of your treatment and safety. Be sure to make and go to all appointments, and call your doctor if you are having problems. It's also a good idea to know your test results and keep a list of the medicines you take. How can you care for yourself at home?   · Have regular checkups all through your pregnancy. This will help you know your baby's position before you go into labor. · Ask your doctor about special exercises that may help to turn your baby into the normal birth position. If your doctor recommends these exercises, do them as your doctor tells you to. When should you call for help? Call your doctor now or seek immediate medical care if:  · You think that you are in labor. Watch closely for changes in your health, and be sure to contact your doctor if you have any other questions or concerns. Where can you learn more? Go to http://esteban-ruthy.info/. Lacey Denny in the search box to learn more about \"Breech Birth: Care Instructions. \"  Current as of: 2017  Content Version: 11.3  © 0694-0877 [employer]. Care instructions adapted under license by [employer] (which disclaims liability or warranty for this information). If you have questions about a medical condition or this instruction, always ask your healthcare professional. [place] [address] any warranty or liability for your use of this information. Turning a Breech Baby: Care Instructions  Your Care Instructions    At the end of a pregnancy, most babies have their head near the birth canal (vagina). But sometimes a baby's rear end or feet are near the birth canal. This position is called breech. If your baby stays in this breech position, you will probably need a  section (). Most breech babies are healthy and don't have problems after birth. Your doctor may try to turn your baby. To do this, the doctor presses on certain places on your belly. Sometimes this causes the baby to turn. The medical name for this process is external cephalic version. If your baby turns, your doctor may send you home. But he or she will check you often until your labor begins. If your baby's head stays down, you may be able to have a vaginal delivery.  But a small number of babies move back into a breech position. During the process of trying to turn your baby, your doctor will carefully watch your uterus. It's possible that the pressure and movement might start contractions. It's also possible that the umbilical cord will twist or get damaged. Follow-up care is a key part of your treatment and safety. Be sure to make and go to all appointments, and call your doctor if you are having problems. It's also a good idea to know your test results and keep a list of the medicines you take. What happens during an external cephalic version? · Your doctor will give you medicine to relax your uterus. Your doctor may give you medicine for pain and to help you relax. · Your doctor will put both hands on your belly. One hand will be near the baby's head. The other hand will be near the baby's rear end. The doctor will push and roll the baby to try to get the head down. · You may feel some pain. The doctor will ask you how you are doing. · Your doctor will use a heart monitor to see how your baby is doing. · After the process, your doctor will give you instructions for your care. Why might you choose to have your baby turned? · You would like to have a vaginal delivery, if possible. · You are 36 or more weeks pregnant with one baby. · Your baby has not dropped into your pelvis. It's easier to move a baby that has not dropped. · Ultrasound shows that you have plenty of amniotic fluid around your baby. What are the risks if your doctor tries to turn your baby? · You will feel pressure or pain when the doctor presses on your belly. · You may need an emergency  section. · The process may cause you to start contractions. In rare cases it can cause the water to break. · This process may not work. When should you call for help? Call 911 anytime you think you may need emergency care. For example, call if:  · You passed out (lost consciousness).   · You have severe vaginal bleeding. · You have severe pain in your belly or pelvis. Call your doctor now or seek immediate medical care if:  · You have any vaginal bleeding. · You have belly pain. · You think that you are in labor. · You have a sudden release of fluid from your vagina. · You notice that your baby has stopped moving or is moving much less than normal.  Watch closely for changes in your health, and be sure to contact your doctor if you have any questions or concerns. Where can you learn more? Go to http://esteban-ruthy.info/. Enter Z618 in the search box to learn more about \"Turning a Breech Baby: Care Instructions. \"  Current as of: March 16, 2017  Content Version: 11.3  © 6641-5239 [employer]. Care instructions adapted under license by [employer] (which disclaims liability or warranty for this information). If you have questions about a medical condition or this instruction, always ask your healthcare professional. [address] any warranty or liability for your use of this information.

## 2017-06-29 ENCOUNTER — HOSPITAL ENCOUNTER (INPATIENT)
Age: 32
LOS: 2 days | Discharge: HOME | End: 2017-07-01
Attending: OBSTETRICS & GYNECOLOGY | Admitting: OBSTETRICS & GYNECOLOGY
Payer: COMMERCIAL

## 2017-06-29 ENCOUNTER — ANESTHESIA (OUTPATIENT)
Dept: LABOR AND DELIVERY | Age: 32
End: 2017-06-29
Payer: COMMERCIAL

## 2017-06-29 ENCOUNTER — ANESTHESIA EVENT (OUTPATIENT)
Dept: LABOR AND DELIVERY | Age: 32
End: 2017-06-29
Payer: COMMERCIAL

## 2017-06-29 LAB
ABO + RH BLD: NORMAL
BASOPHILS # BLD AUTO: 0 K/UL (ref 0–0.06)
BASOPHILS # BLD: 0 % (ref 0–2)
BLOOD GROUP ANTIBODIES SERPL: NORMAL
DIFFERENTIAL METHOD BLD: ABNORMAL
EOSINOPHIL # BLD: 0.1 K/UL (ref 0–0.4)
EOSINOPHIL NFR BLD: 1 % (ref 0–5)
ERYTHROCYTE [DISTWIDTH] IN BLOOD BY AUTOMATED COUNT: 13.5 % (ref 11.6–14.5)
HCT VFR BLD AUTO: 37.5 % (ref 35–45)
HGB BLD-MCNC: 12.6 G/DL (ref 12–16)
LYMPHOCYTES # BLD AUTO: 18 % (ref 21–52)
LYMPHOCYTES # BLD: 1.9 K/UL (ref 0.9–3.6)
MCH RBC QN AUTO: 31 PG (ref 24–34)
MCHC RBC AUTO-ENTMCNC: 33.6 G/DL (ref 31–37)
MCV RBC AUTO: 92.4 FL (ref 74–97)
MONOCYTES # BLD: 0.8 K/UL (ref 0.05–1.2)
MONOCYTES NFR BLD AUTO: 7 % (ref 3–10)
NEUTS SEG # BLD: 8 K/UL (ref 1.8–8)
NEUTS SEG NFR BLD AUTO: 74 % (ref 40–73)
PLATELET # BLD AUTO: 227 K/UL (ref 135–420)
PMV BLD AUTO: 10.2 FL (ref 9.2–11.8)
RBC # BLD AUTO: 4.06 M/UL (ref 4.2–5.3)
SPECIMEN EXP DATE BLD: NORMAL
WBC # BLD AUTO: 10.7 K/UL (ref 4.6–13.2)

## 2017-06-29 PROCEDURE — 77030020255 HC SOL INJ LR 1000ML BG

## 2017-06-29 PROCEDURE — 74011250636 HC RX REV CODE- 250/636

## 2017-06-29 PROCEDURE — 76060000078 HC EPIDURAL ANESTHESIA

## 2017-06-29 PROCEDURE — 77030028990 HC ADH TISS DERMFLX CHMP -B: Performed by: OBSTETRICS & GYNECOLOGY

## 2017-06-29 PROCEDURE — 74011000258 HC RX REV CODE- 258: Performed by: OBSTETRICS & GYNECOLOGY

## 2017-06-29 PROCEDURE — 77010026065 HC OXYGEN MINIMUM MEDICAL AIR

## 2017-06-29 PROCEDURE — 77030031139 HC SUT VCRL2 J&J -A: Performed by: OBSTETRICS & GYNECOLOGY

## 2017-06-29 PROCEDURE — 77030010507 HC ADH SKN DERMBND J&J -B: Performed by: OBSTETRICS & GYNECOLOGY

## 2017-06-29 PROCEDURE — 77030008703 HC TU ET UNCUF COVD -A

## 2017-06-29 PROCEDURE — 74011000250 HC RX REV CODE- 250

## 2017-06-29 PROCEDURE — 74011250636 HC RX REV CODE- 250/636: Performed by: OBSTETRICS & GYNECOLOGY

## 2017-06-29 PROCEDURE — 75410000003 HC RECOV DEL/VAG/CSECN EA 0.5 HR

## 2017-06-29 PROCEDURE — 85025 COMPLETE CBC W/AUTO DIFF WBC: CPT | Performed by: OBSTETRICS & GYNECOLOGY

## 2017-06-29 PROCEDURE — 86900 BLOOD TYPING SEROLOGIC ABO: CPT | Performed by: OBSTETRICS & GYNECOLOGY

## 2017-06-29 PROCEDURE — 74011250637 HC RX REV CODE- 250/637: Performed by: ANESTHESIOLOGY

## 2017-06-29 PROCEDURE — 74011000250 HC RX REV CODE- 250: Performed by: ANESTHESIOLOGY

## 2017-06-29 PROCEDURE — 65270000029 HC RM PRIVATE

## 2017-06-29 PROCEDURE — 76010000391 HC C SECN FIRST 1 HR: Performed by: OBSTETRICS & GYNECOLOGY

## 2017-06-29 PROCEDURE — 77030018842 HC SOL IRR SOD CL 9% BAXT -A: Performed by: OBSTETRICS & GYNECOLOGY

## 2017-06-29 PROCEDURE — 77030007866 HC KT SPN ANES BBMI -B: Performed by: NURSE ANESTHETIST, CERTIFIED REGISTERED

## 2017-06-29 PROCEDURE — 77030015791 HC CATH FOL DRN LXF BARD -A: Performed by: OBSTETRICS & GYNECOLOGY

## 2017-06-29 PROCEDURE — 76060000078 HC EPIDURAL ANESTHESIA: Performed by: OBSTETRICS & GYNECOLOGY

## 2017-06-29 PROCEDURE — 76010000392 HC C SECN EA ADDL 0.5 HR: Performed by: OBSTETRICS & GYNECOLOGY

## 2017-06-29 PROCEDURE — 74011250636 HC RX REV CODE- 250/636: Performed by: NURSE ANESTHETIST, CERTIFIED REGISTERED

## 2017-06-29 PROCEDURE — 77030011640 HC PAD GRND REM COVD -A

## 2017-06-29 PROCEDURE — 74011000250 HC RX REV CODE- 250: Performed by: OBSTETRICS & GYNECOLOGY

## 2017-06-29 PROCEDURE — 77030018836 HC SOL IRR NACL ICUM -A

## 2017-06-29 PROCEDURE — 75410000003 HC RECOV DEL/VAG/CSECN EA 0.5 HR: Performed by: OBSTETRICS & GYNECOLOGY

## 2017-06-29 PROCEDURE — 77030011640 HC PAD GRND REM COVD -A: Performed by: OBSTETRICS & GYNECOLOGY

## 2017-06-29 PROCEDURE — 77030002935 HC SUT MCRYL J&J -C: Performed by: OBSTETRICS & GYNECOLOGY

## 2017-06-29 PROCEDURE — 77030032490 HC SLV COMPR SCD KNE COVD -B

## 2017-06-29 PROCEDURE — 77030008477 HC STYL SATN SLP COVD -A

## 2017-06-29 RX ORDER — DIPHENHYDRAMINE HYDROCHLORIDE 50 MG/ML
25 INJECTION, SOLUTION INTRAMUSCULAR; INTRAVENOUS
Status: DISCONTINUED | OUTPATIENT
Start: 2017-06-29 | End: 2017-07-01 | Stop reason: HOSPADM

## 2017-06-29 RX ORDER — ACETAMINOPHEN 325 MG/1
650 TABLET ORAL
Status: DISCONTINUED | OUTPATIENT
Start: 2017-06-29 | End: 2017-07-01 | Stop reason: HOSPADM

## 2017-06-29 RX ORDER — SODIUM CHLORIDE, SODIUM LACTATE, POTASSIUM CHLORIDE, CALCIUM CHLORIDE 600; 310; 30; 20 MG/100ML; MG/100ML; MG/100ML; MG/100ML
125 INJECTION, SOLUTION INTRAVENOUS CONTINUOUS
Status: DISPENSED | OUTPATIENT
Start: 2017-06-30 | End: 2017-06-30

## 2017-06-29 RX ORDER — TRISODIUM CITRATE DIHYDRATE AND CITRIC ACID MONOHYDRATE 500; 334 MG/5ML; MG/5ML
30 SOLUTION ORAL ONCE
Status: COMPLETED | OUTPATIENT
Start: 2017-06-29 | End: 2017-06-29

## 2017-06-29 RX ORDER — OXYTOCIN/RINGER'S LACTATE 20/1000 ML
PLASTIC BAG, INJECTION (ML) INTRAVENOUS
Status: DISCONTINUED | OUTPATIENT
Start: 2017-06-29 | End: 2017-06-29 | Stop reason: HOSPADM

## 2017-06-29 RX ORDER — IBUPROFEN 400 MG/1
800 TABLET ORAL
Status: DISCONTINUED | OUTPATIENT
Start: 2017-06-29 | End: 2017-07-01 | Stop reason: HOSPADM

## 2017-06-29 RX ORDER — EPHEDRINE SULFATE/0.9% NACL/PF 25 MG/5 ML
SYRINGE (ML) INTRAVENOUS AS NEEDED
Status: DISCONTINUED | OUTPATIENT
Start: 2017-06-29 | End: 2017-06-29 | Stop reason: HOSPADM

## 2017-06-29 RX ORDER — NALOXONE HYDROCHLORIDE 0.4 MG/ML
0.2 INJECTION, SOLUTION INTRAMUSCULAR; INTRAVENOUS; SUBCUTANEOUS
Status: DISCONTINUED | OUTPATIENT
Start: 2017-06-29 | End: 2017-07-01 | Stop reason: HOSPADM

## 2017-06-29 RX ORDER — ONDANSETRON 2 MG/ML
4 INJECTION INTRAMUSCULAR; INTRAVENOUS
Status: DISCONTINUED | OUTPATIENT
Start: 2017-06-29 | End: 2017-07-01 | Stop reason: HOSPADM

## 2017-06-29 RX ORDER — OXYTOCIN/RINGER'S LACTATE 20/1000 ML
PLASTIC BAG, INJECTION (ML) INTRAVENOUS
Status: COMPLETED
Start: 2017-06-29 | End: 2017-06-29

## 2017-06-29 RX ORDER — KETOROLAC TROMETHAMINE 30 MG/ML
30 INJECTION, SOLUTION INTRAMUSCULAR; INTRAVENOUS
Status: DISCONTINUED | OUTPATIENT
Start: 2017-06-29 | End: 2017-06-30

## 2017-06-29 RX ORDER — SODIUM CHLORIDE 0.9 % (FLUSH) 0.9 %
5-10 SYRINGE (ML) INJECTION AS NEEDED
Status: DISCONTINUED | OUTPATIENT
Start: 2017-06-29 | End: 2017-07-01 | Stop reason: HOSPADM

## 2017-06-29 RX ORDER — OXYTOCIN/RINGER'S LACTATE 20/1000 ML
125 PLASTIC BAG, INJECTION (ML) INTRAVENOUS CONTINUOUS
Status: DISCONTINUED | OUTPATIENT
Start: 2017-06-30 | End: 2017-07-01 | Stop reason: HOSPADM

## 2017-06-29 RX ORDER — SIMETHICONE 80 MG
80 TABLET,CHEWABLE ORAL
Status: DISCONTINUED | OUTPATIENT
Start: 2017-06-29 | End: 2017-07-01 | Stop reason: HOSPADM

## 2017-06-29 RX ORDER — MORPHINE SULFATE 1 MG/ML
INJECTION, SOLUTION EPIDURAL; INTRATHECAL; INTRAVENOUS AS NEEDED
Status: DISCONTINUED | OUTPATIENT
Start: 2017-06-29 | End: 2017-06-29 | Stop reason: HOSPADM

## 2017-06-29 RX ORDER — FACIAL-BODY WIPES
10 EACH TOPICAL
Status: DISCONTINUED | OUTPATIENT
Start: 2017-06-29 | End: 2017-07-01 | Stop reason: HOSPADM

## 2017-06-29 RX ORDER — SODIUM CHLORIDE, SODIUM LACTATE, POTASSIUM CHLORIDE, CALCIUM CHLORIDE 600; 310; 30; 20 MG/100ML; MG/100ML; MG/100ML; MG/100ML
125 INJECTION, SOLUTION INTRAVENOUS CONTINUOUS
Status: DISCONTINUED | OUTPATIENT
Start: 2017-06-29 | End: 2017-06-29 | Stop reason: HOSPADM

## 2017-06-29 RX ORDER — ZOLPIDEM TARTRATE 5 MG/1
5 TABLET ORAL
Status: DISCONTINUED | OUTPATIENT
Start: 2017-06-29 | End: 2017-07-01 | Stop reason: HOSPADM

## 2017-06-29 RX ORDER — OXYCODONE AND ACETAMINOPHEN 5; 325 MG/1; MG/1
1-2 TABLET ORAL
Status: DISCONTINUED | OUTPATIENT
Start: 2017-06-29 | End: 2017-07-01 | Stop reason: HOSPADM

## 2017-06-29 RX ORDER — KETOROLAC TROMETHAMINE 30 MG/ML
INJECTION, SOLUTION INTRAMUSCULAR; INTRAVENOUS AS NEEDED
Status: DISCONTINUED | OUTPATIENT
Start: 2017-06-29 | End: 2017-06-29 | Stop reason: HOSPADM

## 2017-06-29 RX ORDER — NALBUPHINE HYDROCHLORIDE 10 MG/ML
5 INJECTION, SOLUTION INTRAMUSCULAR; INTRAVENOUS; SUBCUTANEOUS
Status: DISPENSED | OUTPATIENT
Start: 2017-06-29 | End: 2017-06-30

## 2017-06-29 RX ORDER — PROMETHAZINE HYDROCHLORIDE 25 MG/ML
25 INJECTION, SOLUTION INTRAMUSCULAR; INTRAVENOUS
Status: DISCONTINUED | OUTPATIENT
Start: 2017-06-29 | End: 2017-07-01 | Stop reason: HOSPADM

## 2017-06-29 RX ORDER — ONDANSETRON 2 MG/ML
INJECTION INTRAMUSCULAR; INTRAVENOUS AS NEEDED
Status: DISCONTINUED | OUTPATIENT
Start: 2017-06-29 | End: 2017-06-29 | Stop reason: HOSPADM

## 2017-06-29 RX ORDER — SODIUM CHLORIDE 0.9 % (FLUSH) 0.9 %
5-10 SYRINGE (ML) INJECTION EVERY 8 HOURS
Status: DISCONTINUED | OUTPATIENT
Start: 2017-06-30 | End: 2017-07-01

## 2017-06-29 RX ADMIN — KETOROLAC TROMETHAMINE 30 MG: 30 INJECTION, SOLUTION INTRAMUSCULAR; INTRAVENOUS at 17:23

## 2017-06-29 RX ADMIN — Medication: at 16:37

## 2017-06-29 RX ADMIN — SODIUM CHLORIDE, SODIUM LACTATE, POTASSIUM CHLORIDE, AND CALCIUM CHLORIDE: 600; 310; 30; 20 INJECTION, SOLUTION INTRAVENOUS at 16:12

## 2017-06-29 RX ADMIN — SODIUM CHLORIDE, SODIUM LACTATE, POTASSIUM CHLORIDE, AND CALCIUM CHLORIDE 125 ML/HR: 600; 310; 30; 20 INJECTION, SOLUTION INTRAVENOUS at 23:52

## 2017-06-29 RX ADMIN — Medication 20000 MILLI-UNITS: at 18:43

## 2017-06-29 RX ADMIN — ONDANSETRON 4 MG: 2 INJECTION INTRAMUSCULAR; INTRAVENOUS at 16:53

## 2017-06-29 RX ADMIN — MORPHINE SULFATE 0.3 MG: 1 INJECTION, SOLUTION EPIDURAL; INTRATHECAL; INTRAVENOUS at 16:22

## 2017-06-29 RX ADMIN — SODIUM CHLORIDE 900 MG: 900 INJECTION, SOLUTION INTRAVENOUS at 16:16

## 2017-06-29 RX ADMIN — FAMOTIDINE 20 MG: 10 INJECTION, SOLUTION INTRAVENOUS at 15:51

## 2017-06-29 RX ADMIN — Medication 5 MG: at 16:25

## 2017-06-29 RX ADMIN — NALBUPHINE HYDROCHLORIDE 5 MG: 10 INJECTION, SOLUTION INTRAMUSCULAR; INTRAVENOUS; SUBCUTANEOUS at 23:22

## 2017-06-29 RX ADMIN — Medication: at 17:30

## 2017-06-29 RX ADMIN — KETOROLAC TROMETHAMINE 30 MG: 30 INJECTION, SOLUTION INTRAMUSCULAR at 23:40

## 2017-06-29 RX ADMIN — SODIUM CITRATE AND CITRIC ACID MONOHYDRATE 30 ML: 500; 334 SOLUTION ORAL at 15:50

## 2017-06-29 RX ADMIN — DIPHENHYDRAMINE HYDROCHLORIDE 25 MG: 50 INJECTION, SOLUTION INTRAMUSCULAR; INTRAVENOUS at 19:11

## 2017-06-29 RX ADMIN — Medication 10 ML: at 23:40

## 2017-06-29 NOTE — ANESTHESIA POSTPROCEDURE EVALUATION
Post-Anesthesia Evaluation and Assessment    Patient: Rosangela Krishnamurthy MRN: [id number]  SSN: [id number]    YOB: 1985  Age: 29 y.o. Sex: female       Cardiovascular Function/Vital Signs  Visit Vitals    /69    Pulse 69    Temp 36.6 °C (97.8 °F)    Resp 17    Ht 5' 3\" (1.6 m)    Wt 65.8 kg (145 lb)    SpO2 100%    Breastfeeding Unknown    BMI 25.69 kg/m2       Patient is status post spinal anesthesia for Procedure(s):   SECTION. Nausea/Vomiting: None    Postoperative hydration reviewed and adequate. Pain:  Pain Scale 1: Numeric (0 - 10) (17)  Pain Intensity 1: 1 (17)   Managed    Neurological Status:   Neuro (WDL): Within Defined Limits (17)   At baseline    Mental Status and Level of Consciousness: Arousable    Pulmonary Status:   O2 Device: Room air (17)   Adequate oxygenation and airway patent    Complications related to anesthesia: None    Post-anesthesia assessment completed.  No concerns    Signed By: Alan Cook MD     2017

## 2017-06-29 NOTE — PROGRESS NOTES
Transferred from OR to PACU via bed. Oriented to room and surroundings. Skin warm dry intact. Respirations unlabored and regular. Abd soft non tender. Alonzo to straight drainage. Urine mathieu,  draining without difficulty. SCD intact . Report received from anesthesia/crna. Monitors applied.

## 2017-06-29 NOTE — OP NOTES
Section Operative Note      Name: Halima Subramanian   Medical Record Number: [id number]   Today's Date: 2017      PREOP DIAGNOSIS:   1. IUP at 38 weeks  2. IUGR  3. Breech presentation  POSTOP DIAGNOSIS: same  PROCEDURE: Primary low transverse   Abhinav Patterson MD    ASSISTANT:   ANESTHESIA: Spinal  EBL: 500  IVF:  1000cc  UOP:  200  ANTIBIOTICS: clindamycin or Ancef   COMPLICATIONS: none  CONDITION: stable to recovery    FETAL DESCRIPTION: hubbard female    BIRTH INFORMATION:   Information for the patient's :  Jeovany Mirza [[id number]]   One Minute Apgar: 8 (Filed from Delivery Summary)  Five  Minute Apgar: 9 (Filed from Delivery Summary)      OPERATIVE FINDINGS:      At the time of the surgery a live Female delivered  with an APGAR of  8 and 9 at 1 and 5 minutes respectively. Birth weight was 5lbs 5oz  Amniotic fluid was Clear. Cord had 3 vessels. Inspection of the uterus, fallopian tubes and ovaries revealed normal anatomy. PROCEDURE:    Informed consent was obtained and risks discussed including risk of damage to bowel, bladder, nerves and blood vessels. Consent was obtained for blood transfusion in the case of emergency. The patient was taken to the operating room, where spinal anesthesia was found to be adequate. The patient was prepped and draped in the normal sterile fashion in the supine position. Pfannenstiel skin incision was made with the scalpel and carried down to the underlying fascia. The fascial incision was extended laterally with Santillan scissors. The superior aspect of the fascial incision was grasped with Kocher clamps and the underlying rectus muscles were dissected off bluntly and with Santillan scissors. The inferior aspect of the fascia was grasped  the underlying rectus muscles dissected off in a similar fashion. The rectus muscles were  in the midline. The peritoneum was tented and entered sharply with Metzenbaum scissors. The bladder blade was then inserted. The bladder flap was then created and the bladder blade reinserted. A low transverse uterine incision was made with the scalpel and extended laterally with bandage scissors. There was clear amniotic fluid. The breech was brought to the incision and delivered followed by the lower extremities, torso, upper extremities and head. The cord was clamped and cut and the baby was handed off to the waiting pediatricians. The placenta was then delivered spontaneously. The uterus was exteriorized and cleared of all clots and debris. The uterine incision was closed in two layers. The first layer with running locked layer of 0 Vicryl. The second layer was an imbricating layer of 0 Vicryl with good hemostasis assured. The uterus was returned to the pelvis. The paracolic gutters were irrigated with warm normal saline. The uterine incision was reinspected and there was some bleeding at the midline that was controlled with several figure of eight stitches. The rectus muscles were brought together with a figure of 8 stitch of 0 vicryl. The fascia was closed with 0 Vicryl in a running fashion. The subcuticular layers were reapproximated with 2-0 plain gut in interrupted fashion. The skin was closed with a 4-0 monocryl in a subcuticular fashion. Dermabond was placed on the incision. The patient tolerated the procedure well. Sponge, lap, and needle counts were correct times two and the patient was taken to recovery in stable condition.           Signed By:  Tamika Richards MD     June 29, 2017

## 2017-06-29 NOTE — ANESTHESIA PREPROCEDURE EVALUATION
Anesthetic History   No history of anesthetic complications            Review of Systems / Medical History  Patient summary reviewed, nursing notes reviewed and pertinent labs reviewed    Pulmonary  Within defined limits                 Neuro/Psych              Cardiovascular  Within defined limits                     GI/Hepatic/Renal  Within defined limits              Endo/Other  Within defined limits           Other Findings   Comments:   Risk Factors for Postoperative nausea/vomiting:       History of postoperative nausea/vomiting? NO       Female? YES       Motion sickness? NO       Intended opioid administration for postoperative analgesia? NO      Smoking Abstinence  Current Smoker? NO  Elective Surgery? NO  Seen preoperatively by anesthesiologist or proxy prior to day of surgery? YES  Pt abstained from smoking 24 hours prior to anesthesia?  YES           Physical Exam    Airway  Mallampati: I  TM Distance: 4 - 6 cm  Neck ROM: normal range of motion   Mouth opening: Normal     Cardiovascular    Rhythm: regular  Rate: normal         Dental  No notable dental hx       Pulmonary  Breath sounds clear to auscultation               Abdominal  GI exam deferred       Other Findings            Anesthetic Plan    ASA: 2, emergent  Anesthesia type: spinal          Induction: Intravenous  Anesthetic plan and risks discussed with: Patient

## 2017-06-29 NOTE — IP AVS SNAPSHOT
Summary of Care Report The Summary of Care report has been created to help improve care coordination. Users with access to [employer] or [address] (Web-based application) may access additional patient information including the Discharge Summary. If you are not currently a [address] user and need more information, please call the number listed below in the [place] [address] section and ask to be connected with Medical Records. Facility Information Name Address Phone [address] [facility] [address] [place] [id number] [phone] Patient Information Patient Name Sex  Ghada Hanson ([id number]) Female 1985 Discharge Information Admitting Provider Service Area Unit Romel Latham MD / Sarah Mcmillan 92 3 Mother Baby Unit / [phone] Discharge Provider Discharge Date/Time Discharge Disposition Destination (none) 2017 (Pending) AHR (none) Hospital Problems as of 2017  Reviewed: 2017 10:00 AM by Erik Negrete CNM Class Noted - Resolved Last Modified POA Active Problems * (Principal)Postpartum care following  delivery  2017 - Present 2017 by Erik Negrete CNM No  
  Entered by Erik Negrete CNM Non-Hospital Problems as of 2017  Reviewed: 2017 10:00 AM by Erik Negrete CNM None You are allergic to the following Allergen Reactions Ceclor (Cefaclor) Rash Current Discharge Medication List  
  
START taking these medications Dose & Instructions Dispensing Information Comments  
 docusate sodium 100 mg capsule Commonly known as:  Adiel Mars Dose:  100 mg Take 1 Cap by mouth two (2) times a day. Quantity:  30 Cap Refills:  0  
   
 ibuprofen 800 mg tablet Commonly known as:  MOTRIN Dose:  800 mg Take 1 Tab by mouth every six (6) hours as needed. Quantity:  90 Tab Refills:  1  
   
 oxyCODONE-acetaminophen 5-325 mg per tablet Commonly known as:  PERCOCET Dose:  1-2 Tab Take 1-2 Tabs by mouth every four (4) hours as needed. Max Daily Amount: 12 Tabs. Quantity:  40 Tab Refills:  0 CONTINUE these medications which have NOT CHANGED Dose & Instructions Dispensing Information Comments PRENATAL 1+1 PO Take  by mouth. Refills:  0 Current Immunizations Name Date Tdap 5/15/2017 Surgery Information ID Date/Time Status Primary Surgeon All Procedures Location [id number] 2017 [facility] Eric Schmidt MD  SECTION [facility] L[facility] OR Follow-up Information Follow up With Details Comments Contact Info [address], [address] [facility] In 6 weeks  [facility], [address]) [address] [phone] Martha Farias MD   Patient can only remember the practice name and not the physician Discharge Instructions CONGRATULATIONS ON THE BIRTH OF YOUR BABY! The first six weeks after childbirth is a time of physical and emotional adjustment. This handout will help to answer questions and provide guidance during the postpartum period. Every family's adjustment is unique, so please call if you have further concerns. At anytime we can be reached at [phone]. During office hours please ask to speak to a charge nurse. After hours, the answering service will take a message and the Nurse-Midwife on-call will return your call. If your question can wait until office hours: Monday-Friday 8:30-4:00, please do so. For emergencies or urgent concerns do not hesitate to call us after hours. DIET Your body is in need of a well-balanced, high protein diet to recuperate from birth. Please continue to take your prenatal vitamins for 6 weeks or as long as you are breastfeeding. Continue to drink at least 6-8 cups of water or other liquid a day. A breastfeeding mother also needs extra protein, calories and calcium containing foods. It is a good rule to drink fluids with every feeding in order to maintain an adequate milk supply and avoid dehydration. Your baby will probably not be bothered by things in your diet, but if the baby seems extremely fussy or develops a rash, you may want to discuss possible food intolerances with your baby's care provider. PAIN MEDICATIONS Acetaminophen (Tylenol), ibuprofen (Motrin), or other prescribed pain medication may be taken as directed to relieve discomfort. The above medications pass in very minimal amounts into the breast milk and usually will not cause problems. There are medications that may affect the baby, so please consult your baby's care provider before taking medication. If you are breastfeeding, be sure to mention this to any care provider you see so that medications that are safe may be selected. There is an excellent resource called [employer] that is a resource for medication safety in pregnancy and lactation. You can visit their website at [employer]/ or call them toll free at [phone] if you have any questions about medication safety. UTERINE INVOLUTION / VAGINAL BLEEDING Involution is the process of the uterus returning to pre-pregnant size. It will take approximately six weeks for this process to occur. To achieve this size your uterus becomes firm to slow bleeding loss from the placental site. The first 7 days after birth, the bleeding is red and heavy. It may change with your activity and position. Some small clots are normal.   After ten days, the bleeding should be pale pink and slowed considerably. The next several weeks may progress to a pink, mucousy discharge. This may continue for 6-8 weeks, depending on your activity. During the first four weeks after delivery we recommend using sanitary pads instead of tampons.   Douching should also be avoided, but it is fine to take a tub bath so long as the tub is very clean. ACTIVITY/EXERCISE Adequate rest is essential to recovery. Try to rest or sleep when the baby sleeps. After two weeks, you may begin going for short walks, doing Kegel exercises and abdominal crunches. Avoid heavy, jarring or aerobic exercises. Remember to start out slowly and build up to your previous fitness level. Use common sense and don't overdo as rest is important and the benefits of increased rest are a quicker recovery. For the first two weeks after a  try to limit trips up or down steps. Do not lift anything heavier than the baby during this time. Lifting the baby or other objects should be done by bending at the knees rather than the waist.  Driving should be avoided during the first two to three weeks until you have the strength to push firmly on the brakes in case of an emergency. You may ride as a passenger, but DO wear a seat belt at all times. After a few weeks, you may resume normal activity at whatever pace is comfortable for you. Exercise may also be resumed gradually. Walking is a good way to start. Finally, try to be reasonable in your expectations. Caring for a new baby after major surgery can be quite trying. Arrange for assistance at home to ensure that you get enough rest.  
 
POSTPARTUM CHECK You may call the office when you return home to set up a postpartum visit. Most patients will be seen at 6 weeks after delivery, but after a  or other circumstances you may be seen in 2 weeks or less. If you are discharged from the hospital with staples that must be removed, you will be asked to come in sooner. At your postpartum visit, a pelvic exam may be performed. If you are having any problems or concerns, please do not hesitate to call. Once again our number is [phone]. MOOD CHANGES Significant hormonal changes occur in the days following delivery, and as a result, many women experience brief episodes of tearfulness or feeling \"blue. \"  These emotional swings may be made worse by lack of sleep and by the adjustments inherent in becoming a mother. For some women, these fluctuations are minor. For others, they are overwhelming; creating feelings of anxiety, depression, or the inability to cope. If you have difficulty functioning as a result of feeling down, or if the mood changes seem severe, do not improve, or result is thoughts of harming yourself or others CALL RIGHT AWAY. PERINEAL CARE The basic goals of perineal care are to prevent infection, to relieve pain and promote healing. Your stitches will dissolve in four to six weeks, and do not need to be removed. After urinating, please continue to clean with warm water from front to back. Please continue sitz baths as instructed twice a day for a week or as needed. Call the office if you see pus in the suture site, or have unusual or severe swelling or pain that seems to be getting worse. INCISION CARE If you had a , clean and dry the incision gently as you would the rest of your body. Washing over the area with soap and water, and showering are fine. If steri-strips are present they will gradually come off with time. Tub baths are permitted. You may experience numbness and burning in the area surrounding the incision which usually resolves gradually over the next several weeks or months. RETURN OF MENSTRUATION Your first menstrual period may occur as soon as four to six weeks after your delivery if you are not breast-feeding. If breast-feeding it is more difficult to predict when your first period will occur. Even if you are not yet menstruating, you may be ovulating and it may be possible to conceive again. It is common for your first period after childbirth to be very heavy with an increased amount of cramping. BREASTS Breast-feeding Mothers: Colostrum is excreted in the first 24-72 hours. Mature breast milk will appear on the 2nd to 5th day. Engorgement may occur with the mature milk making your breasts feel warm and very full. Frequent feedings will make you more comfortable. Babies do not nurse on regular schedules. Nursing every 1 1/2 to 2 hours is normal and frequent feeding DOES NOT mean you are not making enough milk. To avoid nipple confusion, do not give bottles for the first 4 weeks. Growth spurts are common and may require more frequent feedings. This is the way baby increases your milk supply. During a growth spurt, you may feel you are feeding very frequently and that your breasts are \"empty. \"  Don't worry, your milk is produced by supply and demand so this increased frequency of feeding will increase your milk supply within 48 hours. Sore nipples may occur with frequent feedings and are sometimes also caused by improper latch. Check for a proper latch. Baby should have a wide open mouth. Use different positions at each feeding if possible. Express a small amount of colostrum or breast milk onto the sore area and leave bra flaps unlatched until dry. The lactation consultant at [facility] is available for outpatient consultation without charge. Call [phone] from Monday-Friday 9:00am- 3:00pm to arrange an outpatient appointment with her. Local [facility] and consultants may also be very helpful. If You Are Not Breast-feeding: You will experience swelling, engorgement and some milk production. There are no safe medications available to stop lactation. Some remedies for engorgement include: wearing a tight bra, ice packs and cold green cabbage leaves placed between the breast and your bra. Change these frequently. Tylenol or Motrin should help with the discomfort. SEXUAL ADJUSTMENTS We recommend that you wait at least four weeks before resuming sexual intercourse.   A sore perineum, a demanding baby and fatigue will certainly affect your ability to enjoy lovemaking! A vaginal lubricant is recommended to help with any dryness. It is very important to remember that you will ovulate BEFORE your first period and can conceive. If you do not wish another pregnancy right away, please take precautions to avoid pregnancy. If you would like a prescription method of birth control, please discuss this with us at your 6 week visit. ELIMINATION We remind all postpartum patients that it may take a few days for your bowels to return to normal, especially if you had a long labor. For those who had C-sections or severe lacerations, we recommend that you use a stool softener twice daily for at least two weeks. Many stool softeners are over-the-counter. Colace (Docusate Sodium) is recommended. Bulk forming agents such as Metamucil or Fibercon may be used daily in addition to a stool softener to promote regular bowel movements. Eating fresh fruits and vegetables along with whole grains is helpful as well. Do not be afraid to have a bowel movement as your stitches will not \"come out\" in the course of having a bowel movement. Urination may be difficult due to soreness around the urethra, or as an after effect of epidural.  This is temporary and can be helped  by squirting water over the perineum or try going in the shower. Hemorrhoids are common after birth. Tucks pads, Anusol cream and avoiding constipation are helpful. If constipation does occur, you may take Milk of Magnesia or Senekot according to the package instructions. DANGER SIGNS! CALL WITHOUT DELAY IF YOU ARE EXPERIENCING ANY OF THE FOLLOWING: 
* Unusually heavy bleeding, soaking more than 1 or more pads in an hour. * Vaginal discharge with strong foul odor. * Fever of 101 or higher * Unusual pain or tenderness in the abdominal area. * If breasts are red, hot or have a painful lump. * Depression that persists longer than 1-2 weeks or is severe.  
* Any urinary frequency accompanied by urgency or pain. * A lump in leg or calf especially if painful, warm or red. We thank you for choosing us for your prenatal care and/or delivery. We wish you all happiness and health with your baby for his or her lifetime! Patient armband removed and shredded Chart Review Routing History No Routing History on File

## 2017-06-29 NOTE — IP AVS SNAPSHOT
[address] Patient: Chel Moore MRN: [id number] DMP: You are allergic to the following Allergen Reactions Ceclor (Cefaclor) Rash Recent Documentation Height Weight Breastfeeding? BMI OB Status Smoking Status 1.6 m 65.8 kg Unknown 25.69 kg/m2 Recent pregnancy Never Smoker About your hospitalization You were admitted on:  2017 You last received care in the:  [place] [address] You were discharged on:  2017 Unit phone number:  [phone] Why you were hospitalized Your primary diagnosis was:  Postpartum Care Following  Delivery Your diagnoses also included:  Normal Labor And Delivery Providers Seen During Your Hospitalizations Provider Role Specialty Primary office phone Roslyn Aguilar MD Attending Provider Obstetrics & Gynecology [phone] Your Primary Care Physician (PCP) Primary Care Physician Office Phone Office Fax OTHER, PHYS ** None ** ** None ** Follow-up Information Follow up With Details Comments Contact [facility] [address], [address] [place] [address] In 6 weeks  [facility] [address] [place] [id number] 
[phone] Phys Dinora, MD   Patient can only remember the practice name and not the physician Current Discharge Medication List  
  
START taking these medications Dose & Instructions Dispensing Information Comments Morning Noon Evening Bedtime  
 docusate sodium 100 mg capsule Commonly known as:  Idella Razor Your last dose was: Your next dose is:    
   
   
 Dose:  100 mg Take 1 Cap by mouth two (2) times a day. Quantity:  30 Cap Refills:  0  
     
   
   
   
  
 ibuprofen 800 mg tablet Commonly known as:  MOTRIN Your last dose was: Your next dose is:    
   
   
 Dose:  800 mg Take 1 Tab by mouth every six (6) hours as needed. Quantity:  90 Tab Refills:  1  
     
   
   
   
  
 oxyCODONE-acetaminophen 5-325 mg per tablet Commonly known as:  PERCOCET Your last dose was: Your next dose is:    
   
   
 Dose:  1-2 Tab Take 1-2 Tabs by mouth every four (4) hours as needed. Max Daily Amount: 12 Tabs. Quantity:  40 Tab Refills:  0 CONTINUE these medications which have NOT CHANGED Dose & Instructions Dispensing Information Comments Morning Noon Evening Bedtime PRENATAL 1+1 PO Your last dose was: Your next dose is: Take  by mouth. Refills:  0 Where to Get Your Medications Information on where to get these meds will be given to you by the nurse or doctor. ! Ask your nurse or doctor about these medications  
  docusate sodium 100 mg capsule  
 ibuprofen 800 mg tablet  
 oxyCODONE-acetaminophen 5-325 mg per tablet Discharge Instructions CONGRATULATIONS ON THE BIRTH OF YOUR BABY! The first six weeks after childbirth is a time of physical and emotional adjustment. This handout will help to answer questions and provide guidance during the postpartum period. Every family's adjustment is unique, so please call if you have further concerns. At anytime we can be reached at [phone]. During office hours please ask to speak to a charge nurse. After hours, the answering service will take a message and the Nurse-Midwife on-call will return your call. If your question can wait until office hours: Monday-Friday 8:30-4:00, please do so. For emergencies or urgent concerns do not hesitate to call us after hours. DIET Your body is in need of a well-balanced, high protein diet to recuperate from birth. Please continue to take your prenatal vitamins for 6 weeks or as long as you are breastfeeding.   Continue to drink at least 6-8 cups of water or other liquid a day.   
A breastfeeding mother also needs extra protein, calories and calcium containing foods. It is a good rule to drink fluids with every feeding in order to maintain an adequate milk supply and avoid dehydration. Your baby will probably not be bothered by things in your diet, but if the baby seems extremely fussy or develops a rash, you may want to discuss possible food intolerances with your baby's care provider. PAIN MEDICATIONS Acetaminophen (Tylenol), ibuprofen (Motrin), or other prescribed pain medication may be taken as directed to relieve discomfort. The above medications pass in very minimal amounts into the breast milk and usually will not cause problems. There are medications that may affect the baby, so please consult your baby's care provider before taking medication. If you are breastfeeding, be sure to mention this to any care provider you see so that medications that are safe may be selected. There is an excellent resource called [employer] that is a resource for medication safety in pregnancy and lactation. You can visit their website at Novel/ or call them toll free at [phone] if you have any questions about medication safety. UTERINE INVOLUTION / VAGINAL BLEEDING Involution is the process of the uterus returning to pre-pregnant size. It will take approximately six weeks for this process to occur. To achieve this size your uterus becomes firm to slow bleeding loss from the placental site. The first 7 days after birth, the bleeding is red and heavy. It may change with your activity and position. Some small clots are normal.   After ten days, the bleeding should be pale pink and slowed considerably. The next several weeks may progress to a pink, mucousy discharge. This may continue for 6-8 weeks, depending on your activity. During the first four weeks after delivery we recommend using sanitary pads instead of tampons.   Douching should also be avoided, but it is fine to take a tub bath so long as the tub is very clean. ACTIVITY/EXERCISE Adequate rest is essential to recovery. Try to rest or sleep when the baby sleeps. After two weeks, you may begin going for short walks, doing Kegel exercises and abdominal crunches. Avoid heavy, jarring or aerobic exercises. Remember to start out slowly and build up to your previous fitness level. Use common sense and don't overdo as rest is important and the benefits of increased rest are a quicker recovery. For the first two weeks after a  try to limit trips up or down steps. Do not lift anything heavier than the baby during this time. Lifting the baby or other objects should be done by bending at the knees rather than the waist.  Driving should be avoided during the first two to three weeks until you have the strength to push firmly on the brakes in case of an emergency. You may ride as a passenger, but DO wear a seat belt at all times. After a few weeks, you may resume normal activity at whatever pace is comfortable for you. Exercise may also be resumed gradually. Walking is a good way to start. Finally, try to be reasonable in your expectations. Caring for a new baby after major surgery can be quite trying. Arrange for assistance at home to ensure that you get enough rest.  
 
POSTPARTUM CHECK You may call the office when you return home to set up a postpartum visit. Most patients will be seen at 6 weeks after delivery, but after a  or other circumstances you may be seen in 2 weeks or less. If you are discharged from the hospital with staples that must be removed, you will be asked to come in sooner. At your postpartum visit, a pelvic exam may be performed. If you are having any problems or concerns, please do not hesitate to call. Once again our number is [phone]. MOOD CHANGES Significant hormonal changes occur in the days following delivery, and as a result, many women experience brief episodes of tearfulness or feeling \"blue. \"  These emotional swings may be made worse by lack of sleep and by the adjustments inherent in becoming a mother. For some women, these fluctuations are minor. For others, they are overwhelming; creating feelings of anxiety, depression, or the inability to cope. If you have difficulty functioning as a result of feeling down, or if the mood changes seem severe, do not improve, or result is thoughts of harming yourself or others CALL RIGHT AWAY. PERINEAL CARE The basic goals of perineal care are to prevent infection, to relieve pain and promote healing. Your stitches will dissolve in four to six weeks, and do not need to be removed. After urinating, please continue to clean with warm water from front to back. Please continue sitz baths as instructed twice a day for a week or as needed. Call the office if you see pus in the suture site, or have unusual or severe swelling or pain that seems to be getting worse. INCISION CARE If you had a , clean and dry the incision gently as you would the rest of your body. Washing over the area with soap and water, and showering are fine. If steri-strips are present they will gradually come off with time. Tub baths are permitted. You may experience numbness and burning in the area surrounding the incision which usually resolves gradually over the next several weeks or months. RETURN OF MENSTRUATION Your first menstrual period may occur as soon as four to six weeks after your delivery if you are not breast-feeding. If breast-feeding it is more difficult to predict when your first period will occur. Even if you are not yet menstruating, you may be ovulating and it may be possible to conceive again. It is common for your first period after childbirth to be very heavy with an increased amount of cramping. BREASTS Breast-feeding Mothers: Colostrum is excreted in the first 24-72 hours. Mature breast milk will appear on the 2nd to 5th day. Engorgement may occur with the mature milk making your breasts feel warm and very full. Frequent feedings will make you more comfortable. Babies do not nurse on regular schedules. Nursing every 1 1/2 to 2 hours is normal and frequent feeding DOES NOT mean you are not making enough milk. To avoid nipple confusion, do not give bottles for the first 4 weeks. Growth spurts are common and may require more frequent feedings. This is the way baby increases your milk supply. During a growth spurt, you may feel you are feeding very frequently and that your breasts are \"empty. \"  Don't worry, your milk is produced by supply and demand so this increased frequency of feeding will increase your milk supply within 48 hours. Sore nipples may occur with frequent feedings and are sometimes also caused by improper latch. Check for a proper latch. Baby should have a wide open mouth. Use different positions at each feeding if possible. Express a small amount of colostrum or breast milk onto the sore area and leave bra flaps unlatched until dry. The lactation consultant at [facility] is available for outpatient consultation without charge. Call [phone] from Monday-Friday 9:00am- 3:00pm to arrange an outpatient appointment with her. Local [facility] and consultants may also be very helpful. If You Are Not Breast-feeding: You will experience swelling, engorgement and some milk production. There are no safe medications available to stop lactation. Some remedies for engorgement include: wearing a tight bra, ice packs and cold green cabbage leaves placed between the breast and your bra. Change these frequently. Tylenol or Motrin should help with the discomfort. SEXUAL ADJUSTMENTS We recommend that you wait at least four weeks before resuming sexual intercourse.   A sore perineum, a demanding baby and fatigue will certainly affect your ability to enjoy lovemaking! A vaginal lubricant is recommended to help with any dryness. It is very important to remember that you will ovulate BEFORE your first period and can conceive. If you do not wish another pregnancy right away, please take precautions to avoid pregnancy. If you would like a prescription method of birth control, please discuss this with us at your 6 week visit. ELIMINATION We remind all postpartum patients that it may take a few days for your bowels to return to normal, especially if you had a long labor. For those who had C-sections or severe lacerations, we recommend that you use a stool softener twice daily for at least two weeks. Many stool softeners are over-the-counter. Colace (Docusate Sodium) is recommended. Bulk forming agents such as Metamucil or Fibercon may be used daily in addition to a stool softener to promote regular bowel movements. Eating fresh fruits and vegetables along with whole grains is helpful as well. Do not be afraid to have a bowel movement as your stitches will not \"come out\" in the course of having a bowel movement. Urination may be difficult due to soreness around the urethra, or as an after effect of epidural.  This is temporary and can be helped  by squirting water over the perineum or try going in the shower. Hemorrhoids are common after birth. Tucks pads, Anusol cream and avoiding constipation are helpful. If constipation does occur, you may take Milk of Magnesia or Senekot according to the package instructions. DANGER SIGNS! CALL WITHOUT DELAY IF YOU ARE EXPERIENCING ANY OF THE FOLLOWING: 
* Unusually heavy bleeding, soaking more than 1 or more pads in an hour. * Vaginal discharge with strong foul odor. * Fever of 101 or higher * Unusual pain or tenderness in the abdominal area. * If breasts are red, hot or have a painful lump. * Depression that persists longer than 1-2 weeks or is severe.  
* Any urinary frequency accompanied by urgency or pain. * A lump in leg or calf especially if painful, warm or red. We thank you for choosing us for your prenatal care and/or delivery. We wish you all happiness and health with your baby for his or her lifetime! Patient armband removed and shredded Discharge Instructions Attachments/References  SECTION: PRE-OP (ENGLISH) POSTPARTUM (ENGLISH) BREASTFEEDING (ENGLISH) BREASTFEEDING MOTHERS: NUTRITION (ENGLISH) DEPRESSION: POSTPARTUM (ENGLISH) Discharge Orders None Introducing [facility]! Anna Cisneros introduces [employer] patient portal. Now you can access parts of your medical record, email your doctor's office, and request medication refills online. 1. In your internet browser, go to [email or web address] 2. Click on the First Time User? Click Here link in the Sign In box. You will see the New Member Sign Up page. 3. Enter your [employer] Access Code exactly as it appears below. You will not need to use this code after youve completed the sign-up process. If you do not sign up before the expiration date, you must request a new code. · [employer] Access Code: [email or web address] Expires: 2017  9:14 AM 
 
4. Enter the last four digits of your Social Security Number (xxxx) and Date of Birth (mm/dd/yyyy) as indicated and click Submit. You will be taken to the next sign-up page. 5. Create a [employer] ID. This will be your [employer] login ID and cannot be changed, so think of one that is secure and easy to remember. 6. Create a [employer] password. You can change your password at any time. 7. Enter your Password Reset Question and Answer. This can be used at a later time if you forget your password. 8. Enter your e-mail address. You will receive e-mail notification when new information is available in [address] 9. Click Sign Up. You can now view and download portions of your medical record.  
10. Click the Download Summary menu link to download a portable copy of your medical information. If you have questions, please visit the Frequently Asked Questions section of the [employer] website. Remember, [employer] is NOT to be used for urgent needs. For medical emergencies, dial 911. Now available from your iPhone and Android! General Information Please provide this summary of care documentation to your next provider. Patient Signature:  ____________________________________________________________ Date:  ____________________________________________________________  
  
Fayrene Retort Provider Signature:  ____________________________________________________________ Date:  ____________________________________________________________ More Information  Section: Before Your Surgery What is a  section? A  section, or , is surgery to deliver your baby through a cut the doctor makes in your lower belly and uterus. This cut is also called an incision. In many cases, the doctor makes the cut just above the pubic hairline. In other cases, it runs from the belly button to the pubic hairline. Both cuts leave a scar. It most often fades with time. The surgery may be done while you are awake but your belly is numb. This lets you be awake for the birth of your baby. Less often, women need general anesthesia. This means you are asleep during the surgery. Most women go home about 3 days after the birth. You may feel better each day. But you will likely need about 6 weeks to fully recover. During the first few weeks you will need extra help with household chores. But you will be able to care for your baby. You can do things like breastfeed and change diapers. Follow-up care is a key part of your treatment and safety. Be sure to make and go to all appointments, and call your doctor if you are having problems.  It's also a good idea to know your test results and keep a list of the medicines you take. What happens before surgery? Surgery can be stressful. This information will help you understand what you can expect. And it will help you safely prepare for surgery. Preparing for surgery · Understand exactly what surgery is planned, along with the risks, benefits, and other options. · Tell your doctors ALL the medicines, vitamins, supplements, and herbal remedies you take. Some of these can increase the risk of bleeding or interact with anesthesia. · If you take blood thinners, be sure to talk to your doctor. He or she will tell you if you should stop taking these medicines before your surgery. Make sure that you understand exactly what your doctor wants you to do. · Your doctor will tell you which medicines to take or stop before your surgery. You may need to stop taking certain medicines a week or more before surgery, so talk to your doctor as soon as you can. · If you have an advance directive, let your doctor know. It may include a living will and a durable power of  for health care. Bring a copy to the hospital. If you don't have one, you may want to prepare one. It lets your doctor and loved ones know your health care wishes. Doctors advise that everyone prepare these papers before any type of surgery or procedure. What happens on the day of surgery? · Follow the instructions exactly about when to stop eating and drinking. If you don't, your surgery may be canceled. If your doctor told you to take your medicines on the day of surgery, take them with only a sip of water. · Take a bath or shower before you come in for your surgery. Do not apply lotions, perfumes, deodorants, or nail polish. · Do not shave the surgical site yourself. · Take off all jewelry and piercings. And take out contact lenses, if you wear them. At the hospital or surgery center · Bring a picture ID. · You will be kept comfortable and safe by your anesthesia provider.  The anesthesia may make you sleep. Or it may just numb the area being worked on. · The surgery will take about 1 hour. Going home · Be sure you have someone to drive you home. Anesthesia and pain medicine make it unsafe for you to drive. · You will be given more specific instructions about recovering from your surgery. They will cover things like diet, wound care, follow-up care, driving, and getting back to your normal routine. When should you call your doctor? · You have questions or concerns. · You don't understand how to prepare for your surgery. · You become ill before the surgery (such as fever, flu, or a cold). · You need to reschedule or have changed your mind about having the surgery. Where can you learn more? Go to http://esteban-ruthy.info/. Enter F523 in the search box to learn more about \" Section: Before Your Surgery. \" Current as of: 2017 Content Version: 11.3 © 8135-5236 [employer]. Care instructions adapted under license by [employer] (which disclaims liability or warranty for this information). If you have questions about a medical condition or this instruction, always ask your healthcare professional. [place] [address] any warranty or liability for your use of this information. After Your Delivery (the Postpartum Period): Care Instructions Your Care Instructions Congratulations on the birth of your baby. Like pregnancy, the  period can be a time of excitement, john, and exhaustion. You may look at your wondrous little baby and feel happy. You may also be overwhelmed by your new sleep hours and new responsibilities. At first, babies often sleep during the days and are awake at night. They do not have a pattern or routine. They may make sudden gasps, jerk themselves awake, or look like they have crossed eyes. These are all normal, and they may even make you smile.  
In these first weeks after delivery, try to take good care of yourself. It may take 4 to 6 weeks to feel like yourself again, and possibly longer if you had a  birth. You will likely feel very tired for several weeks. Your days will be full of ups and downs, but lots of john as well. Follow-up care is a key part of your treatment and safety. Be sure to make and go to all appointments, and call your doctor if you are having problems. It's also a good idea to know your test results and keep a list of the medicines you take. How can you care for yourself at home? Take care of your body after delivery · Use pads instead of tampons for the bloody flow that may last as long as 2 weeks. · Ease cramps with ibuprofen (Advil, Motrin). · Ease soreness of hemorrhoids and the area between your vagina and rectum with ice compresses or witch hazel pads. · Ease constipation by drinking lots of fluid and eating high-fiber foods. Ask your doctor about over-the-counter stool softeners. · Cleanse yourself with a gentle squeeze of warm water from a bottle instead of wiping with toilet paper. · Take a sitz bath in warm water several times a day. · Wear a good nursing bra. Ease sore and swollen breasts with warm, wet washcloths. · If you are not breastfeeding, use ice rather than heat for breast soreness. · Your period may not start for several months if you are breastfeeding. You may bleed more, and longer at first, than you did before you got pregnant. · Wait until you are healed (about 4 to 6 weeks) before you have sexual intercourse. Your doctor will tell you when it is okay to have sex. · Try not to travel with your baby for 5 or 6 weeks. If you take a long car trip, make frequent stops to walk around and stretch. Avoid exhaustion · Rest every day. Try to nap when your baby naps. · Ask another adult to be with you for a few days after delivery. · Plan for  if you have other children.  
· Stay flexible so you can eat at odd hours and sleep when you need to. Both you and your baby are making new schedules. · Plan small trips to get out of the house. Change can make you feel less tired. · Ask for help with housework, cooking, and shopping. Remind yourself that your job is to care for your baby. Know about help for postpartum depression · \"Baby blues\" are common for the first 1 to 2 weeks after birth. You may cry or feel sad or irritable for no reason. · Rest whenever you can. Being tired makes it harder to handle your emotions. · Go for walks with your baby. · Talk to your partner, friends, and family about your feelings. · If your symptoms last for more than a few weeks, or if you feel very depressed, ask your doctor for help. · Postpartum depression can be treated. Support groups and counseling can help. Sometimes medicine can also help. Stay healthy · Eat healthy foods so you have more energy, make good breast milk, and lose extra baby pounds. · If you breastfeed, avoid alcohol and drugs. Stay smoke-free. If you quit during pregnancy, congratulations. · Start daily exercise after 4 to 6 weeks, but rest when you feel tired. · Learn exercises to tone your belly. Do Kegel exercises to regain strength in your pelvic muscles. You can do these exercises while you stand or sit. ¨ Squeeze the same muscles you would use to stop your urine. Your belly and thighs should not move. ¨ Hold the squeeze for 3 seconds, and then relax for 3 seconds. ¨ Start with 3 seconds. Then add 1 second each week until you are able to squeeze for 10 seconds. ¨ Repeat the exercise 10 to 15 times for each session. Do three or more sessions each day. · Find a class for new mothers and new babies that has an exercise time. · If you had a  birth, give yourself a bit more time before you exercise, and be careful. When should you call for help? Call 911 anytime you think you may need emergency care. For example, call if: 
· You passed out (lost consciousness).  
Call your doctor now or seek immediate medical care if: 
· You have severe vaginal bleeding. This means you are passing blood clots and soaking through a pad each hour for 2 or more hours. · You are dizzy or lightheaded, or you feel like you may faint. · You have a fever. · You have new belly pain, or your pain gets worse. Watch closely for changes in your health, and be sure to contact your doctor if: 
· Your vaginal bleeding seems to be getting heavier. · You have new or worse vaginal discharge. · You feel sad, anxious, or hopeless for more than a few days. · You do not get better as expected. Where can you learn more? Go to http://esteban-ruthy.info/. Enter A461 in the search box to learn more about \"After Your Delivery (the Postpartum Period): Care Instructions. \" Current as of: March 16, 2017 Content Version: 11.3 © 5696-9801 Optichron. Care instructions adapted under license by [employer]-Market (which disclaims liability or warranty for this information). If you have questions about a medical condition or this instruction, always ask your healthcare professional. [place] [address] any warranty or liability for your use of this information. Breastfeeding: Care Instructions Your Care Instructions Breastfeeding has many benefits. It may lower your baby's chances of getting an infection. It also may prevent your baby from having problems such as diabetes and high cholesterol later in life. Breastfeeding also helps you bond with your baby. The American Academy of Pediatrics recommends breastfeeding for at least a year. That may be very hard for many women to do, but breastfeeding even for a shorter period of time is a health benefit to you and your baby. In the first days after birth, your breasts make a thick, yellow liquid called colostrum. This liquid gives your baby nutrients and antibodies against infection.  It is all that babies need in the first days after birth. Your breasts will fill with milk a few days after the birth. Breastfeeding is a skill that gets better with practice. It is common to have some problems. Some women have sore or cracked nipples, blocked milk ducts, or a breast infection (mastitis). But if you feed your baby every 1 to 2 hours during the day and follow the tips on this sheet, you may not have these problems. You can treat these problems if they happen and continue breastfeeding. Follow-up care is a key part of your treatment and safety. Be sure to make and go to all appointments, and call your doctor if you are having problems. It's also a good idea to know your test results and keep a list of the medicines you take. How can you care for yourself at home? · Breastfeed your baby whenever he or she is hungry. In the first 2 weeks, your baby will feed about every 1 to 3 hours. This will help you keep up your supply of milk. · Put a bed pillow or a nursing pillow on your lap to support your arms and your baby. · Hold your baby in a comfortable position. ¨ You can hold your baby in several ways. One of the most common positions is the cradle hold. One arm supports your baby, with his or her head in the bend of your elbow. Your open hand supports your baby's bottom or back. Your baby's belly lies against yours. ¨ If you had your baby by , or , try the football hold. This position keeps your baby off your belly. Tuck your baby under your arm, with his or her body along the side you will be feeding on. Support your baby's upper body with your arm. With that hand you can control your baby's head to bring his or her mouth to your breast. 
¨ Try different positions with each feeding. If you are having problems, ask for help from your doctor or a lactation consultant.  
· To get your baby to latch on: 
¨ Support and narrow your breast with one hand using a \"U hold,\" with your thumb on the outer side of your breast and your fingers on the inner side. You can also use a \"C hold,\" with all your fingers below the nipple and your thumb above it. Try the different holds to get the deepest latch for whichever breastfeeding position you use. Your other arm is behind your baby's back, with your hand supporting the base of the baby's head. Position your fingers and thumb to point toward your baby's ears. ¨ You can touch your baby's lower lip with your nipple to get your baby to open his or her mouth. Wait until your baby opens up really wide, like a big yawn. Then be sure to bring the baby quickly to your breastnot your breast to the baby. As you bring your baby toward your breast, use your other hand to support the breast and guide it into his or her mouth. ¨ Both the nipple and a large portion of the darker area around the nipple (areola) should be in the baby's mouth. The baby's lips should be flared outward, not folded in (inverted). ¨ Listen for a regular sucking and swallowing pattern while the baby is feeding. If you cannot see or hear a swallowing pattern, watch the baby's ears, which will wiggle slightly when the baby swallows. If the baby's nose appears to be blocked by your breast, tilt the baby's head back slightly, so just the edge of one nostril is clear for breathing. ¨ When your baby is latched, you can usually remove your hand from supporting your breast and bring it under your baby to cradle him or her. Now just relax and breastfeed your baby. · You will know that your baby is feeding well when: 
¨ His or her mouth covers a lot of the areola, and the lips are flared out. ¨ His or her chin and nose rest against your breast. 
¨ Sucking is deep and rhythmic, with short pauses. ¨ You are able to see and hear your baby swallowing. ¨ You do not feel pain in your nipple.  
· If your baby takes only one breast at a feeding, start the next feeding on the other breast. 
· Anytime you need to remove your baby from the breast, put one finger in the corner of his or her mouth. Push your finger between your baby's gums to gently break the seal. If you do not break the tight seal before you remove your baby, your nipples can become sore, cracked, or bruised. · After feeding your baby, gently pat his or her back to let out any swallowed air. After your baby burps, offer the breast again, or offer the other breast. Sometimes a baby will want to keep feeding after being burped. When should you call for help? Call your doctor now or seek immediate medical care if: 
· You have symptoms of a breast infection, such as: 
¨ Increased pain, swelling, redness, or warmth around a breast. 
¨ Red streaks extending from the breast. 
¨ Pus draining from a breast. 
¨ A fever. · Your baby has no wet diapers for 6 hours. Watch closely for changes in your health, and be sure to contact your doctor if: 
· Your baby has trouble latching on to your breast. 
· You continue to have pain or discomfort when breastfeeding. · You have other questions or concerns. Where can you learn more? Go to http://estebanHele Massageruthy.info/. Enter P492 in the search box to learn more about \"Breastfeeding: Care Instructions. \" Current as of: March 16, 2017 Content Version: 11.3 © 2384-8342 Wedding Reality. Care instructions adapted under license by NearbyNow (which disclaims liability or warranty for this information). If you have questions about a medical condition or this instruction, always ask your healthcare professional. [place] [address] any warranty or liability for your use of this information. Nutrition for Breastfeeding Mothers: Care Instructions Your Care Instructions When a woman breastfeeds her baby, she needs more nutrients to keep herself healthy and to make the baby's milk. Breastfeeding helps build the bond between you and your baby.  It gives your baby excellent health benefits. A healthy diet includes eating a variety of foods from the basic food groups: grains, vegetables, fruits, milk and milk products (such as cheese and yogurt), and meat and dried beans. Eating well during breastfeeding will ensure that you stay healthy and your baby grows and develops normally. Follow-up care is a key part of your treatment and safety. Be sure to make and go to all appointments, and call your doctor if you are having problems. It's also a good idea to know your test results and keep a list of the medicines you take. How can you care for yourself at home? · Include 3 to 4 cups of nonfat or low-fat milk or milk products in your diet every day. These include: ¨ Milk (8 ounces equals 1 cup). ¨ Ice cream (1½ cups equals 1 cup of milk). ¨ Cheese (1½ ounces of cheese equals 1 cup). ¨ Yogurt (8 ounces equals 1 cup). · Eat at least 7 ounces of grains, such as cereals, breads, crackers, rice, or pasta, every day. One ounce is about 1 slice of bread, 1 cup of breakfast cereal, or ½ cup of cooked rice, cereal, or pasta. · Eat 3 cups of vegetables each day. Choices include: ¨ Dark-green vegetables such as broccoli and spinach. ¨ Orange vegetables such as carrots and sweet potatoes. ¨ Dried beans (such as brantley and kidney beans) and peas (such as lentils). · Every day, eat 2 cups of fresh, frozen, or canned fruit. · Eat 6½ ounces each day of protein, such as chicken, fish, lean meat, eggs, peanut butter, dried beans and peas, nuts, and seeds. One egg, 1 tablespoon of peanut butter, or ½ ounce of nuts or seeds equals 1 ounce of protein. A ½ cup of cooked beans equals 2 ounces of protein. · Drink plenty of fluids, enough so that your urine is light yellow or clear like water. If you have kidney, heart, or liver disease and have to limit fluids, talk with your doctor before you increase the amount of fluids you drink. · Limit caffeine products, such as coffee, tea, chocolate, and some sodas. Caffeine can pass to your baby through breast milk. It may cause fussiness and sleep problems in babies. · Your doctor may recommend a vitamin supplement. Take it as recommended. · Consider joining a breastfeeding support group. These are offered at many hospitals and birthing centers by nurses, nurse-midwives, or lactation consultants. When should you call for help? Watch closely for changes in your health, and be sure to contact your doctor if: 
· You feel that you are not making enough milk for your baby. · You are losing a lot of weight. · You do not think your baby is gaining enough weight. · You would like help to plan a healthy diet. Where can you learn more? Go to http://estebanEnergesis Pharmaceuticalsruthy.info/. Enter P234 in the search box to learn more about \"Nutrition for Breastfeeding Mothers: Care Instructions. \" Current as of: May 30, 2016 Content Version: 11.3 © 8457-0825 Gravity Jack. Care instructions adapted under license by [employer]ovo (which disclaims liability or warranty for this information). If you have questions about a medical condition or this instruction, always ask your healthcare professional. [place] [address] any warranty or liability for your use of this information. Depression After Childbirth: Care Instructions Your Care Instructions Many women get the \"baby blues\" during the first few days after childbirth. You may lose sleep, feel irritable, and cry easily. You may feel happy one minute and sad the next. Hormone changes are one cause of these emotional changes. Also, the demands of a new baby, along with visits from relatives or other family needs, add to a mother's stress. The \"baby blues\" often peak around the fourth day. Then they ease up in less than 2 weeks. If your moodiness or anxiety lasts for more than 2 weeks, or if you feel like life is not worth living, you may have postpartum depression.  This is different for each mother. Some mothers with serious depression may worry intensely about their infant's well-being. Others may feel distant from their child. Some mothers might even feel that they might harm their baby. A mother may have signs of paranoia, wondering if someone is watching her. Depression is not a sign of weakness. It is a medical condition that requires treatment. Medicine and counseling often work well to reduce depression. Talk to your doctor about taking antidepressant medicine while breastfeeding. Follow-up care is a key part of your treatment and safety. Be sure to make and go to all appointments, and call your doctor if you are having problems. It's also a good idea to know your test results and keep a list of the medicines you take. How do you know if you are depressed? With all the changes in your life, you may not know if you are depressed. Pregnancy sometimes causes changes in how you feel that are similar to the symptoms of depression. Symptoms of depression include: · Feeling sad or hopeless and losing interest in daily activities. These are the most common symptoms of depression. · Sleeping too much or not enough. · Feeling tired. You may feel as if you have no energy. · Eating too much or too little. · Writing or talking about death, such as writing suicide notes or talking about guns, knives, or pills. Keep the numbers for these national suicide hotlines: 3-562-651-TALK (3-909.594.8887) and 5-557-VBRYVXM (3-254.764.4296). If you or someone you know talks about suicide or feeling hopeless, get help right away. How can you care for yourself at home? · Be safe with medicines. Take your medicines exactly as prescribed. Call your doctor if you think you are having a problem with your medicine. · Eat a healthy diet so that you can keep up your energy. · Get regular daily exercise, such as walks, to help improve your mood. · Get as much sunlight as possible.  Keep your shades and curtains open. Get outside as much as you can. · Avoid using alcohol or other substances to feel better. · Get as much rest and sleep as possible. Avoid doing too much. Being too tired can increase depression. · Play stimulating music throughout your day and soothing music at night. · Schedule outings and visits with friends and family. Ask them to call you regularly, so that you do not feel alone. · Ask for help with preparing food and other daily tasks. Family and friends are often happy to help a mother with a . · Be honest with yourself and those who care about you. Tell them about your struggle. · Join a support group of new mothers. No one can better understand the challenges of caring for a  than other new mothers. · If you feel like life is not worth living or are feeling hopeless, get help right away. Keep the numbers for these national suicide hotlines: 7-788-264-TALK (8-739.346.7726) and 6-569-DCKAHYQ (0-702.127.4550). When should you call for help? Call 911 anytime you think you may need emergency care. For example, call if: 
· You feel you cannot stop from hurting yourself, your baby, or someone else. Call your doctor now or seek immediate medical care if: 
· You are having trouble caring for yourself or your baby. · You hear voices. Watch closely for changes in your health, and be sure to contact your doctor if: 
· You have problems with your depression medicine. · You do not get better as expected. Where can you learn more? Go to http://esteban-ruthy.info/. Enter Q937 in the search box to learn more about \"Depression After Childbirth: Care Instructions. \" Current as of: 2016 Content Version: 11.3 © 2081-9997 [employer] Payments Cloud, Jebbit. Care instructions adapted under license by AkaRx (which disclaims liability or warranty for this information).  If you have questions about a medical condition or this instruction, always ask your healthcare professional. [place] [address] any warranty or liability for your use of this information.

## 2017-06-29 NOTE — ANESTHESIA PROCEDURE NOTES
Spinal Block    Start time: 6/29/2017 4:15 PM  End time: 6/29/2017 4:22 PM  Performed by: KARLEE Early  Authorized by: Faviola Queen     Pre-procedure:   Indications: primary anesthetic  Preanesthetic Checklist: patient identified, risks and benefits discussed, anesthesia consent, site marked, patient being monitored and timeout performed      Spinal Block:   Patient Position:  Seated  Prep Region:  Lumbar  Prep: Betadine      Location:  L3-4  Technique:  Single shot  Local:  Lidocaine 1%      Needle:   Needle Type:  Pencan  Needle Gauge:  24 G  Attempts:  2      Events: CSF confirmed        Assessment:  Insertion:  Uncomplicated  Patient tolerance:  Patient tolerated the procedure well with no immediate complications

## 2017-06-29 NOTE — L&D DELIVERY NOTE
Delivery Summary    Patient: Kinga Baron MRN: [id number]  SSN: [id number]    YOB: 1985  Age: 29 y.o. Sex: female        Labor Events:    Labor: No    Rupture Date: 2017    Rupture Time: 4:37 PM    Rupture Type: AROM    Amniotic Fluid Volume:      Amniotic Fluid Description:  Amniotic fluid Odor: Clear    None     Induction: None        Induction Date:        Induction Time:       Indications for Induction:       Augmentation: None    Augmentation Date:      Augmentation Time:      Indications for Augmentation:      Events:       Cervical Ripening:       None    Rupture Identifier: Rupture 1     Labor complications: None     Additional complications:         Delivery Events:  Estimated Blood Loss (ml):        Information for the patient's :  Elisha Bland [[id number]]     Delivery Summary - Baby    Delivery Date: 2017  Delivery Time: 4:37 PM  Delivery Type: , Low Transverse    Section Delivery:     Sex:  female     Gestational Age: 38w0d  Delivery Clinician:  Monroe Horn   Living?: Living  Delivery Location: OR           APGARS  One minute Five minutes Ten minutes   Skin color: 0   1        Heart rate: 2   2        Grimace: 2   2        Muscle tone: 2   2        Breathin   2        Totals: 8   9           Presentation: Breech    Position:   Sacrum    Resuscitation Method:        Meconium Stained:        Cord Information: 3 Vessels   Complications: None  Cord Blood Sent?:  Yes    Blood Gases Sent?:  No    Placenta:  Date/Time:   4:38 PM  Removal: Manual Removal      Appearance:        Measurements:  Birth Weight:        Birth Length:        Head Circumference:        Chest Circumference:       Abdominal Girth: Other Providers:   Carlos HOUGH;JONO MONZON;ZOILA DAMON;NOLVIA HUANG;Raina PRADO JENNIFER, L Obstetrician;Primary Nurse;Primary [place] Nurse;Pediatrician; Anesthesiologist;[employer] Beta Strep:   Lab Results   Component Value Date/Time    GrBStrep, External negative 06/15/2017        Cord Blood Results:  Information for the patient's :  Blane Keep [[id number]]   No results found for: ABORH, PCTABR, PCTDIG, BILI, ABORHEXT, ABORH    Information for the patient's :  Blane Keep [[id number]]   No results found for: APH, APCO2, APO2, AHCO3, ABEC, ABDC, O2ST, SITE, [place], PHI, [place], PO2I, HCO3I, SO2I, IBD    Information for the patient's :  Blane Keep [[id number]]   No results found for: EPHV, PCO2V, PO2V, HCO3V, O2STV, EBDV

## 2017-06-29 NOTE — H&P
History & Physical    Name: Jass Rivera MRN: [id number]  SSN: [id number]    YOB: 1985  Age: 29 y.o. Sex: female      Subjective:     Estimated Date of Delivery: 17  OB History      Para Term  AB Living    1         SAB TAB Ectopic Molar Multiple Live Births                 Jonas Mckeon is a 34yo  at 37 weeks with IUGR and breech and plans for primary . Originally planned to deliver in the [facility] but fetus breech and ECV unsuccessful. Presented today for routine prenatal care and fundal height lagging. US done and EFW 9% with AC 1%. Discussed IUGR and management. Recommend proceeding with PCS since term. She and  agree. Peds and anesthesia aware. Past Medical History:   Diagnosis Date    Breech presentation 2017     No past surgical history on file. Social History     Occupational History    Not on file. Social History Main Topics    Smoking status: NKDA    Smokeless tobacco:     Alcohol use     Drug use:     Sexual activity:      No family history on file. Allergies   Allergen Reactions    Ceclor [Cefaclor] Rash     Prior to Admission medications    Not on File        Review of Systems: A comprehensive review of systems was negative except for that written in the History of Present Illness. Objective:     Vitals: There were no vitals filed for this visit. Physical Exam:  Patient without distress.   Heart: Regular rate and rhythm  Lung: clear to auscultation throughout lung fields, no wheezes, no rales, no rhonchi and normal respiratory effort  Abdomen: soft, nontender  Fundus: soft and non tender  Perineum: blood absent, amniotic fluid absent  Cervical Exam: deferred  Lower Extremities: nontender  Membranes:  Intact  Fetal Heart Rate: Reactive    Prenatal Labs:   Lab Results   Component Value Date/Time    Rubella, External immune 2016    GrBStrep, External negative 06/15/2017    HBsAg, External neg 2016 HIV, External neg 12/06/2016    RPR, External non-reactive 12/06/2016         Impression/Plan:     IUP at 38 weeks  IUGR and AC 1%  Breech presentation    Informed consent obtained for PCS at term for IUGR. Discussed risks of bleeding, infection, damage to bowel and bladder. Reviewed logistics of procedure. Plan for surgery around 4pm today.     Signed By:  Jessee Rivers MD     June 29, 2017

## 2017-06-30 LAB
HCT VFR BLD AUTO: 29.3 % (ref 35–45)
HGB BLD-MCNC: 9.8 G/DL (ref 12–16)

## 2017-06-30 PROCEDURE — 74011250636 HC RX REV CODE- 250/636: Performed by: OBSTETRICS & GYNECOLOGY

## 2017-06-30 PROCEDURE — 85014 HEMATOCRIT: CPT | Performed by: OBSTETRICS & GYNECOLOGY

## 2017-06-30 PROCEDURE — 65270000029 HC RM PRIVATE

## 2017-06-30 PROCEDURE — 74011250636 HC RX REV CODE- 250/636: Performed by: NURSE ANESTHETIST, CERTIFIED REGISTERED

## 2017-06-30 PROCEDURE — 85018 HEMOGLOBIN: CPT | Performed by: OBSTETRICS & GYNECOLOGY

## 2017-06-30 PROCEDURE — 74011250637 HC RX REV CODE- 250/637: Performed by: OBSTETRICS & GYNECOLOGY

## 2017-06-30 PROCEDURE — 77030036554

## 2017-06-30 PROCEDURE — 77030027138 HC INCENT SPIROMETER -A

## 2017-06-30 PROCEDURE — 36415 COLL VENOUS BLD VENIPUNCTURE: CPT | Performed by: OBSTETRICS & GYNECOLOGY

## 2017-06-30 RX ADMIN — IBUPROFEN 800 MG: 400 TABLET, FILM COATED ORAL at 17:12

## 2017-06-30 RX ADMIN — KETOROLAC TROMETHAMINE 30 MG: 30 INJECTION, SOLUTION INTRAMUSCULAR at 05:51

## 2017-06-30 RX ADMIN — OXYCODONE HYDROCHLORIDE AND ACETAMINOPHEN 1 TABLET: 5; 325 TABLET ORAL at 17:13

## 2017-06-30 RX ADMIN — Medication 10 ML: at 05:51

## 2017-06-30 RX ADMIN — NALBUPHINE HYDROCHLORIDE 5 MG: 10 INJECTION, SOLUTION INTRAMUSCULAR; INTRAVENOUS; SUBCUTANEOUS at 07:41

## 2017-06-30 RX ADMIN — KETOROLAC TROMETHAMINE 30 MG: 30 INJECTION, SOLUTION INTRAMUSCULAR at 12:45

## 2017-06-30 RX ADMIN — OXYCODONE HYDROCHLORIDE AND ACETAMINOPHEN 2 TABLET: 5; 325 TABLET ORAL at 21:21

## 2017-06-30 NOTE — ROUTINE PROCESS
Bedside and Verbal shift change report given to [address] (oncoming nurse) by [facility] RN (offgoing nurse). Report included the following information SBAR, OR Summary, Procedure Summary, Intake/Output, MAR and Recent Results.

## 2017-06-30 NOTE — PROGRESS NOTES
[address]  [phone]    Post-Partum Day Number 1  Progress Note    Patient doing well post-partum without significant complaints. Voiding without difficulty, normal lochia. Breast feeding well. Emotionally stable. Breastfeeding: Infant Feeding: Breastmilk  Vitals:  Patient Vitals for the past 8 hrs:   BP Temp Pulse Resp SpO2   17 0551 98/70 98.9 °F (37.2 °C) 74 15 98 %   17 0149 91/53 99 °F (37.2 °C) 71 15 97 %     Temp (24hrs), Av.3 °F (36.8 °C), Min:97.5 °F (36.4 °C), Max:99 °F (37.2 °C)      Vital signs stable, afebrile. Exam:  Patient is in good general condition. Emotionally: appears to be stable  Fundus:  Firm and not tender. Incision: dry and intact    Lab/Data Review:  CBC: Lab Results   Component Value Date/Time    WBC 10.7 2017 02:15 PM    RBC 4.06 2017 02:15 PM    HGB 9.8 2017 05:57 AM    HCT 29.3 2017 05:57 AM    PLATELET 020  02:15 PM     Lab results reviewed. For significant abnormal values and values requiring intervention, see assessment and plan. Assessment:  Post Partum Day number 1  PT stable and doing well. Plan:    Continue routine perineal care and maternal education. Plan discharge tomorrow if no problems occur.          Lesli Wall CNM  2017

## 2017-06-30 NOTE — PROGRESS NOTES
Baby announcement.     [address] [facility]   Staff [address] [facility]   [phone]

## 2017-06-30 NOTE — PROGRESS NOTES
~~ 0725 ASSUME CARE OF PT SITTING IN BED HOLDING UNWRAPPED BABY TRYING TO BREAST FEED--  REVIEWED IMPORTANCE OF KEEPING BABY WELL WRAPPED SINCE TEMP DOWN--  WHEN BABY TEMP CHECKED SHOWED A LOW TEMP BABY TAKEN TO THE NSY- PT DENIES NEED FOR PAIN MED & DESIRED ITCH MED-- PT DENIES NAUSEA OR INCREASED BLEEDING    ~~0740 ASSESSMENT AS CHARTED. NUBAIN GIVEN FOR SEVERE ITCHING, S/E REVIEWED- WHEN MED IN, IV HEPLOCKED-  SR UP X2, CB IN REACH, FOB IN-  SEQ STOCKINGS OFF, VERY ITCHY & PT PLANNING TO GET OOB- AFTER IV MED PT SLEEPY- W/ BABY IN THE NSY ENCOURAGED TO REST & WILL D/C GONGORA A BIT LATER-  PT AGREES- FOB ON FOLD-OUT RESTING AS WELL-- PT HAS FULL WATER MUG- REVIEWED QUIET TIME.    ~~0800 REG DIET BREAKFAST TRAY SERVED    ~~0830 BABY RETURNED TO MOMS ROOM W/ INSTRUCTION TO KEEP WELL WRAPPED & FEED Q2-3 HRS- PT VOICES UNDERSTANDING. ITCHING BETTER    ~~0915 GONGORA REMOVED. 1500CC PALOE CL YELLOW URINE IN THE BAG-- PAD CHANGED & STRETCH PANTIES ON. PT MOVING & LIFTING WELL-  REVIEWED W/ PT THAT SHE NEEDS ESCORT 1st TIME OOB-- UNDERSTANDS. BABY AGAIN UNWRAPPED-- TEMP CHECKED, WNL-- REVIEWED BREAST FEEDING EXPECTATIONS 1st 24 HRS--    ~~0940 PT GIVEN INCENTIVE SPIROMETER (NEVER HAD ONE)--  REVIEWED USE. PT ABLE TO DEMO- ENCOURAGED USAGE-      ~~ 1100 PT ASSISTED UP TO BR BY XIOMARA RN TO VOID 350CC W/O DIFF. INSTRUCTION FOR THUY CARE GIVEN. PAD CHANGED, STRETCH PANTIES ON, PT INTO OWN CLOTHS. AM CARE DONE-- PT W/ STEADY GAIT-  BED PAD CHANGED, PILLOW CASES CHANGED OUT. FAMILY IN VISITING. ABD BINDER APPLIED PER PT REQ     ~~1130 PT UP WALKING IN THE ROOM-- ENCOURAGED PT NOT TO DO TOO MUCH TOO SOON- UNDERSTANDS    ~~1200 REG DIET LUNCH TRAY SERVED    ~~1230 PT IN ROCKING CHAIR PREPARING TO BREAST FEED-- PT VOIDED 800CC W/O DIFF. VS CHECKED.  MORE FRIENDS IN VISITING- WHEN ASKED PT RATES PAIN 5/10--  WILL GET MED    ~~1245 HL FLUSHED W/ NS, TORRADOL GIVEN & HL FLUSHED--  REVIEWED W/ PT THIS IS LAST DOSE THEN WILL GO TO PO MEDS-     ~~ 1330 PAIN MED EFFECTIVE-- PT NOTES MORE DISCOMF WHEN UP & MOVING V/S RESTING-- REVIEWED WNL & ENCOURAGED PT TO REST A BIT- SHE AGREES.    ALL VISITORS OUT.  QUIET TIME

## 2017-06-30 NOTE — PROGRESS NOTES
Pt transferred in stable condition to room 3401. pericare provided, fundus firm 1 below umbilicus, scd's on, pt tolerating clear fluids. Pain managed with toradol. Report given to Benny Shaw RN.

## 2017-06-30 NOTE — ROUTINE PROCESS
TRANSFER - IN REPORT:    Verbal report received from DANICA Galdamez RN(name) on Raquel Carter  being received from Labor and Delivery(unit) for routine progression of care      Report consisted of patients Situation, Background, Assessment and   Recommendations(SBAR). Information from the following report(s) SBAR, Kardex, Intake/Output, MAR and Recent Results was reviewed with the receiving nurse. Opportunity for questions and clarification was provided. Assessment completed upon patients arrival to unit and care assumed.

## 2017-06-30 NOTE — LACTATION NOTE
Mom states baby nursed well during night, sleepy now. Blood sugars have been normal.  Baby is not yet 24 hours old. Discussed blood sugar, nursing pattern/expectations. Encouraged skin-to-skin. Offered help with feedings. Info sheet and daily log given.

## 2017-06-30 NOTE — LACTATION NOTE
Helped mom position and latch baby. Baby latched well with help. Initial latch is painful, then gets better. Reviewed colostrum, latch, milk coming in, hindmilk, breaking latch, nurse every 3 hours or on demand. Offered help with feedings.

## 2017-06-30 NOTE — ROUTINE PROCESS
Bedside and Verbal shift  given to Dain Mistry RN (oncoming nurse) by DANICA Bermudez RN (offgoing nurse). Report included the following information SBAR, Kardex, OR Summary, Procedure Summary, Intake/Output, MAR, Recent Results and Med Rec Status. Assumed care of pt. Introduced myself and updated whiteboard, explained nursing plan of care for my shift. Pt alert and oriented; assessment and vitals completed, WNL. Pt denies headache, visual disturbances and epigastric pain. Pt has no c/o pain that she rates 0/10. Pt verbalized agreement to plan. Questions answered.

## 2017-07-01 VITALS
HEART RATE: 69 BPM | SYSTOLIC BLOOD PRESSURE: 100 MMHG | RESPIRATION RATE: 18 BRPM | HEIGHT: 63 IN | DIASTOLIC BLOOD PRESSURE: 65 MMHG | TEMPERATURE: 97.7 F | BODY MASS INDEX: 25.69 KG/M2 | WEIGHT: 145 LBS | OXYGEN SATURATION: 99 %

## 2017-07-01 PROCEDURE — 74011250637 HC RX REV CODE- 250/637: Performed by: OBSTETRICS & GYNECOLOGY

## 2017-07-01 RX ORDER — DOCUSATE SODIUM 100 MG/1
100 CAPSULE, LIQUID FILLED ORAL 2 TIMES DAILY
Qty: 30 CAP | Refills: 0 | Status: SHIPPED | OUTPATIENT
Start: 2017-07-01 | End: 2020-12-29

## 2017-07-01 RX ORDER — IBUPROFEN 800 MG/1
800 TABLET ORAL
Qty: 90 TAB | Refills: 1 | Status: SHIPPED | OUTPATIENT
Start: 2017-07-01 | End: 2019-09-07

## 2017-07-01 RX ORDER — OXYCODONE AND ACETAMINOPHEN 5; 325 MG/1; MG/1
1-2 TABLET ORAL
Qty: 40 TAB | Refills: 0 | Status: SHIPPED | OUTPATIENT
Start: 2017-07-01 | End: 2019-09-07

## 2017-07-01 RX ADMIN — OXYCODONE HYDROCHLORIDE AND ACETAMINOPHEN 2 TABLET: 5; 325 TABLET ORAL at 01:32

## 2017-07-01 RX ADMIN — IBUPROFEN 800 MG: 400 TABLET, FILM COATED ORAL at 01:32

## 2017-07-01 RX ADMIN — OXYCODONE HYDROCHLORIDE AND ACETAMINOPHEN 2 TABLET: 5; 325 TABLET ORAL at 08:06

## 2017-07-01 RX ADMIN — IBUPROFEN 800 MG: 400 TABLET, FILM COATED ORAL at 10:35

## 2017-07-01 NOTE — PROGRESS NOTES
Progress Note    Patient: Aly Scott MRN: [id number]     YOB: 1985  Age: 29 y.o. Subjective:     Post-Operative Day: 2    The patient is feeling well. Pain is  well controlled with current medications. Urinary output is adequate. The patient is ambulating well and tolerating a regular diet. Pt is passing flatus. Baby is feeding via breast without difficulty. Objective:      Patient Vitals for the past 12 hrs:   Temp Pulse Resp BP SpO2   17 0135 98.1 °F (36.7 °C) 72 16 99/56 100 %       General:    alert   Bowel Sounds:  active   Lochia:  appropriate   Uterine Fundus:   firm @ umbilicus    Incision:  Dry & Intact, some bruising present   DVT Evaluation:  No evidence of DVT seen on physical exam.     Lab/Data Review:  No results found for this or any previous visit (from the past 24 hour(s)). All lab results for the last 24 hours reviewed. Assessment:     Delivery: repeat  section    Plan:     Doing well postpartum  delivery. DC home. Follow-up in the office in 6 weeks. Call prn. Current Discharge Medication List      START taking these medications    Details   ibuprofen (MOTRIN) 800 mg tablet Take 1 Tab by mouth every six (6) hours as needed. Qty: 90 Tab, Refills: 1      oxyCODONE-acetaminophen (PERCOCET) 5-325 mg per tablet Take 1-2 Tabs by mouth every four (4) hours as needed. Max Daily Amount: 12 Tabs. Qty: 40 Tab, Refills: 0      docusate sodium (COLACE) 100 mg capsule Take 1 Cap by mouth two (2) times a day. Qty: 30 Cap, Refills: 0         CONTINUE these medications which have NOT CHANGED    Details   PRENATAL VIT/IRON FUM/FOLIC AC (PRENATAL 1+1 PO) Take  by mouth.              Signed By: Marlena Aragon CNM     2017

## 2017-07-01 NOTE — DISCHARGE SUMMARY
Obstetrical Discharge Summary     Name: Evita Mccallum MRN: [id number]  SSN: [id number]    YOB: 1985  Age: 29 y.o. Sex: female      Admit Date: 2017    Discharge Date: 2017     Admitting Physician: Karissa Denise MD     Attending Physician:  Karissa Denise MD     * Admission Diagnoses: Normal labor and delivery    * Discharge Diagnoses:   Information for the patient's :  Bethany Bermudez [[id number]]   Delivery of a 2.42 kg female infant via , Low Transverse on 2017 at 4:37 PM  by . Apgars were 8 and 9. Additional Diagnoses:   Hospital Problems as of 2017  Date Reviewed: 2017          Codes Class Noted - Resolved POA    * (Principal)Postpartum care following  delivery ICD-10-CM: Z39.2  ICD-9-CM: V24.2  2017 - Present No        RESOLVED: Normal labor and delivery ICD-10-CM: O80  ICD-9-CM: 037  2017 - 2017 Unknown             Lab Results   Component Value Date/Time    ABO/Rh(D) O POSITIVE 2017 02:15 PM    Rubella, External immune 2016    GrBStrep, External negative 06/15/2017    ABO,Rh o pos 2016      Immunization History   Administered Date(s) Administered    Tdap 05/15/2017       * Procedures:   Procedure(s):   SECTION           * Discharge Condition: stable    * Hospital Course: Normal hospital course following the delivery. * Disposition: Home    Discharge Medications:   Current Discharge Medication List      START taking these medications    Details   ibuprofen (MOTRIN) 800 mg tablet Take 1 Tab by mouth every six (6) hours as needed. Qty: 90 Tab, Refills: 1      oxyCODONE-acetaminophen (PERCOCET) 5-325 mg per tablet Take 1-2 Tabs by mouth every four (4) hours as needed. Max Daily Amount: 12 Tabs. Qty: 40 Tab, Refills: 0      docusate sodium (COLACE) 100 mg capsule Take 1 Cap by mouth two (2) times a day.   Qty: 30 Cap, Refills: 0         CONTINUE these medications which have NOT CHANGED Details   PRENATAL VIT/IRON FUM/FOLIC AC (PRENATAL 1+1 PO) Take  by mouth. * Follow-up Care/Patient Instructions:   Activity: Activity as tolerated, No driving for 2 weeks, No sex for 6 weeks and No heavy lifting for 6 weeks  Diet: Regular Diet  Wound Care: Keep wound clean and dry    Follow-up Information     Follow up With Details Comments Bello Baugh [facility] In 6 weeks  [facility]  [phone]           Signed By:  Stephane Padilla CNM     July 1, 2017

## 2017-07-01 NOTE — PROGRESS NOTES
Bedside and Verbal shift change report given to Hanh (oncoming nurse) by Tae Callahan RN (offgoing nurse). Report included the following information SBAR, Kardex, Procedure Summary, Intake/Output, MAR, Recent Results and Med Rec Status. 1945- pt is sitting up in the pull out couch holding baby. Reviewed POC for the night, baby taken to nursery for weight. Reviewed pain mgmt plan & side effects of percocet.

## 2017-07-01 NOTE — DISCHARGE INSTRUCTIONS

## 2017-07-01 NOTE — ROUTINE PROCESS
Verbal shift change report given to Hubert Krause RN (oncoming nurse) by Yesi Fry RN (offgoing nurse). Report included the following information Kardex, Intake/Output, MAR and Recent Results. 5767--assessment done--discharge planned for today--voiding without difficulty--denies weakness when up--has not had BM--reminded the narcotics are very constipating--high fiber/high fluid diet encouraged--effective pain management with Percocet and Motrin--breast feeding is going well--POC for discharge discussed--verbalizes understanding. 1250--discharge instructions reviewed and signed  1340--discharged via wheelchair with baby in car seat carrier. Baby transported home in a car seat.

## 2019-01-23 ENCOUNTER — HOSPITAL ENCOUNTER (OUTPATIENT)
Dept: LAB | Age: 34
Discharge: HOME | End: 2019-01-23
Payer: COMMERCIAL

## 2019-01-23 PROCEDURE — 87624 HPV HI-RISK TYP POOLED RSLT: CPT

## 2019-01-23 PROCEDURE — 88175 CYTOPATH C/V AUTO FLUID REDO: CPT

## 2019-06-19 ENCOUNTER — OFFICE VISIT (OUTPATIENT)
Dept: CARDIOLOGY CLINIC | Age: 34
End: 2019-06-19

## 2019-06-19 VITALS
BODY MASS INDEX: 26.58 KG/M2 | SYSTOLIC BLOOD PRESSURE: 106 MMHG | HEART RATE: 107 BPM | WEIGHT: 150 LBS | OXYGEN SATURATION: 98 % | HEIGHT: 63 IN | DIASTOLIC BLOOD PRESSURE: 70 MMHG

## 2019-06-19 DIAGNOSIS — Z76.89 ENCOUNTER TO ESTABLISH CARE: Primary | ICD-10-CM

## 2019-06-19 DIAGNOSIS — R00.2 PALPITATIONS: ICD-10-CM

## 2019-06-19 NOTE — PATIENT INSTRUCTIONS
Mercedes Bolden will call to schedule your testing within 48 hours.  (Echo and Holter monitor)    If you do not hear from her, then please call central scheduling at [phone] or [phone] Antonio Tinoco)    All testing/lab work is completed at [facility] [address] [place] [address], [facility]     You may call for results ([phone]) echo results will be ready in 2-3 days, holter results take longer so that will be ready about 1-2 weeks

## 2019-06-19 NOTE — PROGRESS NOTES
1. Have you been to the ER, urgent care clinic since your last visit? Hospitalized since your last visit? No    2. Have you seen or consulted any other health care providers outside of the [address] [place] [address] since your last visit? Include any pap smears or colon screening.  No

## 2019-06-19 NOTE — PROGRESS NOTES
[facility]    Ms. Ria Chan is a 45-year-old female with no significant past medical history. She was asked to come see me for the ablation of tachycardia. She is 27-week pregnant. Approximately a 4-6 wks ago she had some palpitation and she had showed a rapid heartbeat. This lasted for a few days. She also has an episode when she felt like she would have some back and chest discomfort with deep breaths. However that resolved without any intervention. Since then she has been feeling fine without any symptoms to suggest angina or heart failure. She denies any palpitation, presyncope or syncope. She is able to perform activity of daily living without any complaint. She denies any lower extremity swelling. She denies any sweating or diarrhea  Denies any nausea, vomiting, abdominal pain, fever, chills, sputum production. No hematuria or other bleeding complaints    Past Medical History:   Diagnosis Date    Breech presentation 6/22/2017    Infertility, female     Polycystic disease, ovaries          Past Surgical History:   Procedure Laterality Date    HX WISDOM TEETH EXTRACTION  2005       Current Outpatient Medications   Medication Sig    PRENATAL VIT/IRON FUM/FOLIC AC (PRENATAL 1+1 PO) Take  by mouth.  ibuprofen (MOTRIN) 800 mg tablet Take 1 Tab by mouth every six (6) hours as needed.  oxyCODONE-acetaminophen (PERCOCET) 5-325 mg per tablet Take 1-2 Tabs by mouth every four (4) hours as needed. Max Daily Amount: 12 Tabs.  docusate sodium (COLACE) 100 mg capsule Take 1 Cap by mouth two (2) times a day. No current facility-administered medications for this visit. Allergies and Sensitivities:  Allergies   Allergen Reactions    Ceclor [Cefaclor] Rash       Family History:  No family history on file.     Social History:  Social History     Tobacco Use    Smoking status: Never Smoker    Smokeless tobacco: Never Used Substance Use Topics    Alcohol use: No    Drug use: No     She  reports that she has never smoked. She has never used smokeless tobacco.  She  reports that she does not drink alcohol. Review of Systems:  Cardiac symptoms as noted above in HPI. All others negative. Denies fatigue, malaise, skin rash, joint pain, blurring vision, photophobia, neck pain, hemoptysis, chronic cough, nausea, vomiting, hematuria, burning micturition, BRBPR, chronic headaches. Physical Exam:  BP Readings from Last 3 Encounters:   06/19/19 106/70   07/01/17 100/65   06/22/17 107/69         Pulse Readings from Last 3 Encounters:   06/19/19 (!) 107   07/01/17 69   06/22/17 77          Wt Readings from Last 3 Encounters:   06/19/19 150 lb (68 kg)   06/29/17 145 lb (65.8 kg)       Constitutional: Oriented to person, place, and time. HENT: Head: Normocephalic and atraumatic. Eyes: Conjunctivae and extraocular motions are normal.   Neck: No JVD present. Carotid bruit is not appreciated. Cardiovascular: Regular rhythm. No murmur, gallop or rubs appreciated  Lung: Breath sounds normal. No respiratory distress. No ronchi or rales appreciated  Abdominal: No tenderness. No rebound and no guarding. Musculoskeletal: There is no lower extremity edema. No cynosis  Lymphadenopathy:  No cervical or supraclavicular adenopathy appriciated. Neurological: No gross motor deficit noted. Skin: No visible skin rash noted. No Ear discharge noted  Psychiatric: Normal mood and affect. Good distal pulse      Review of Data  LABS:   No results found for: NA, K, CL, CO2, GLU, BUN, CREA  No flowsheet data found. No results found for: GPT, ALT  No results found for: HBA1C, HGBE8, QEL8WMTI, KSC6JTVJ    EKG  (0/19) sinus tachycardia at 105 bpm.    ECHO    STRESS TEST (EST, PHARM, NUC, ECHO etc)    CATHETERIZATION    IMPRESSION & PLAN:    In regards to tachycardia, this appears benign without any associated presyncope or syncope.   Will place Holter monitor to check heart rate variability. We will proceed with echocardiogram to rule out structural abnormality of the heart. If there is no recent TSH checked by OB/GYN team, would recommend that she check TSH. In absence of any presyncope or syncope or any palpitation, would like to follow-up as of now. Her heart rate initially when she came to the office was 107 bpm.  When I examined her later in the clinic visit, heart rate was 88 bpm.  Have asked her to keep herself well-hydrated    Importance of diet and exercise was discussed with patient. This plan was discussed with patient who is in agreement. Thank you for allowing me to participate in patient care. Please feel free to call me if you have any question or concern. Jean Pierre Joyner MD  Please note: This document has been produced using voice recognition software. Unrecognized errors in transcription may be present.

## 2019-07-26 ENCOUNTER — HOSPITAL ENCOUNTER (OUTPATIENT)
Dept: NON INVASIVE DIAGNOSTICS | Age: 34
Discharge: HOME | End: 2019-07-26
Attending: INTERNAL MEDICINE
Payer: COMMERCIAL

## 2019-07-26 VITALS
DIASTOLIC BLOOD PRESSURE: 70 MMHG | HEIGHT: 63 IN | WEIGHT: 150 LBS | SYSTOLIC BLOOD PRESSURE: 106 MMHG | BODY MASS INDEX: 26.58 KG/M2

## 2019-07-26 DIAGNOSIS — R00.2 PALPITATIONS: ICD-10-CM

## 2019-07-26 LAB
ECHO AO ASC DIAM: 2.85 CM
ECHO AO ROOT DIAM: 3.01 CM
ECHO IVC SNIFF: 0.98 CM
ECHO LV INTERNAL DIMENSION DIASTOLIC: 5.3 CM (ref 3.9–5.3)
ECHO LV INTERNAL DIMENSION SYSTOLIC: 3.64 CM
ECHO LV IVSD: 0.59 CM (ref 0.6–0.9)
ECHO LV MASS 2D: 105.3 G (ref 67–162)
ECHO LV MASS INDEX 2D: 61.5 G/M2 (ref 43–95)
ECHO LV POSTERIOR WALL DIASTOLIC: 0.51 CM (ref 0.6–0.9)
ECHO LVOT DIAM: 2.08 CM
ECHO LVOT PEAK GRADIENT: 4.1 MMHG
ECHO LVOT PEAK VELOCITY: 101.3 CM/S
ECHO LVOT SV: 74.6 ML
ECHO LVOT VTI: 22.02 CM
ECHO MV A VELOCITY: 55.1 CM/S
ECHO MV AREA PHT: 4.3 CM2
ECHO MV E DECELERATION TIME (DT): 177.5 MS
ECHO MV E VELOCITY: 75.94 CM/S
ECHO MV E/A RATIO: 1.38
ECHO MV PRESSURE HALF TIME (PHT): 51.5 MS
ECHO PULMONARY ARTERY SYSTOLIC PRESSURE (PASP): 20 MMHG

## 2019-07-26 PROCEDURE — 93226 XTRNL ECG REC<48 HR SCAN A/R: CPT

## 2019-07-26 PROCEDURE — 93306 TTE W/DOPPLER COMPLETE: CPT

## 2019-08-13 ENCOUNTER — TELEPHONE (OUTPATIENT)
Dept: CARDIOLOGY CLINIC | Age: 34
End: 2019-08-13

## 2019-08-13 NOTE — TELEPHONE ENCOUNTER
Per Dr. Pantera Leon, called pt to advise that holter monitor and echo were normal. Patient verbalized understanding and had no other complaints at this time.      Verbal order and read back per Bouchra Valdes MD

## 2019-08-29 ENCOUNTER — OFFICE VISIT (OUTPATIENT)
Dept: SURGERY | Age: 34
End: 2019-08-29

## 2019-08-29 VITALS
WEIGHT: 158 LBS | OXYGEN SATURATION: 94 % | DIASTOLIC BLOOD PRESSURE: 71 MMHG | RESPIRATION RATE: 20 BRPM | HEIGHT: 63 IN | TEMPERATURE: 96.9 F | HEART RATE: 105 BPM | BODY MASS INDEX: 28 KG/M2 | SYSTOLIC BLOOD PRESSURE: 105 MMHG

## 2019-08-29 DIAGNOSIS — N63.10 BREAST MASS, RIGHT: Primary | ICD-10-CM

## 2019-08-29 RX ORDER — MAGNESIUM OXIDE 200 MG
TABLET,CHEWABLE ORAL
COMMUNITY
End: 2020-12-29

## 2019-08-29 NOTE — LETTER
8/29/2019 9:21 AM 
 
Patient:  Julieth Bertrand YOB: 1985 Date of Visit: 8/29/2019 Lamar Beckman MD 
[address] [facility] [address] [facility] [address] VIA Facsimile: [phone] Jarocho Cameron MD 
[address] [facility] [address] VIA In Basket Dear MD Jarocho Nelson MD, 
 
 
I had the pleasure of seeing Ms. Isauro Dobbins in my office today for her right breast mass. I am including a copy of my office visit today. If you have questions, please do not hesitate to call me. I look forward to following Ms. Yamilka Khan along with you and will keep you updated as to her progress. Sincerely, Alessandra Locke MD

## 2019-08-29 NOTE — PATIENT INSTRUCTIONS
If you have and questions or concerns about today's appointment, the verbal and/or written instructions you were given for follow up care, please call our office at [phone]. [address] [facility]    [address] [place], [place] [address]arelis, Kirit Franklin [address] [place] [address]  Office: [phone]   Fax: [phone]

## 2019-08-29 NOTE — PROGRESS NOTES
Breast Mass Consult      Ms. Sanjuanita Alfaro is a 30year old woman who is currently 45 weeks pregnant who was referred for a right breast mass. It is located in the upper outer quadrant. She initially noticed the mass around 2019. It has increased in size. It is bothersome. She enrique history of similar symptoms previously. She reports induced nipple discharge, which she also experienced during a previous pregnancy. Imaging has been performed and is consistent with sebaceous cyst.        Breast/GYN history:    Past Medical History:   Diagnosis Date    Breech presentation 2017    Infertility, female     Polycystic disease, ovaries        Past Surgical History:   Procedure Laterality Date    HX  SECTION      HX WISDOM TEETH EXTRACTION         Current Outpatient Medications on File Prior to Visit   Medication Sig Dispense Refill    magnesium oxide 200 mg magnesium chew magnesium   250 mg qd      PRENATAL VIT/IRON FUM/FOLIC AC (PRENATAL 1+1 PO) Take  by mouth.  ibuprofen (MOTRIN) 800 mg tablet Take 1 Tab by mouth every six (6) hours as needed. 90 Tab 1    oxyCODONE-acetaminophen (PERCOCET) 5-325 mg per tablet Take 1-2 Tabs by mouth every four (4) hours as needed. Max Daily Amount: 12 Tabs. 40 Tab 0    docusate sodium (COLACE) 100 mg capsule Take 1 Cap by mouth two (2) times a day. 30 Cap 0     No current facility-administered medications on file prior to visit. Allergies   Allergen Reactions    Ceclor [Cefaclor] Rash       Social History     Tobacco Use    Smoking status: Never Smoker    Smokeless tobacco: Never Used   Substance Use Topics    Alcohol use: No    Drug use: No       No family history on file.       ROS:   Positives are bolded  General: fevers, chills, night sweats, fatigue, weight loss, weight gain  GI: nausea, vomiting, abdominal pain, change in bowel habits, hematochezia, melena, GERD  Integ: dermatitis, abnormal moles  HEENT: visual changes, vertigo, epistaxis, dysphagia, hoarseness  Cardiac: chest pain, palpitations, HTN, edema, varicosities  Resp: cough, shortness of breath, wheezing, hemoptysis, snoring, reactive airway disease  : hematuria, dysuria, frequency, urgency, nocturia, stress urinary incontinence   MSK: weakness, joint pain, arthritis  Endocrine: diabetes, thyroid disease, polyuria, polydipsia, polyphagia, poor wound healing, heat intolerance, cold intolerance  Lymph/Heme: anemia, bruising, history of blood transfusions  Neuro: dizziness, headache, fainting, seizures, stroke  Psych: anxiety, depression    Physical Exam:  Visit Vitals  /71 (BP 1 Location: Left arm, BP Patient Position: Sitting)   Pulse (!) 105   Temp 96.9 °F (36.1 °C) (Oral)   Resp 20   Ht 5' 3\" (1.6 m)   Wt 71.7 kg (158 lb)   SpO2 94%   BMI 27.99 kg/m²       Gen: No distress  Head: Normocephalic, atraumatic  Mouth: Clear, no overt lesions, oral mucosa pink and moist  Neck: Supple, no masses, no adenopathy, trachea midline  Resp: Clear bilaterally  Cardio: Regular rate and rhythm  Abdomen: soft, nontender, nondistended  Extremeties: warm, well-perfused  Neuro: sensation and strength grossly intact and symmetrical  Psych: alert and oriented to person, place and time  Breasts:  Right: Examined in both the supine and upright positions. There was no supraclavicular, infraclavicular, or axillary lympadenopathy. There were no dominant masses, no skin changes, no asymmetry identified smooth approx 3mm skin nodule with associated pit  Left:  Examined in both the supine and upright positions. There was no supraclavicular, infraclavicular, or axillary lympadenopathy.    There were no dominant masses, no skin changes, no asymmetry identified       Imaging:  3/29/19 right breast ultrasound ([facility])  The palpable finding in the right breast 9 to 930 position likely represents a tiny sebaceous cyst  BIRADS 2    1/23/19 right breast ultrasound ([facility])  Normal breast tissue is not seen in the area of the palpable lump in the right breast.  No intraductal mass  BIRADS 1      Impression:  Patient Active Problem List   Diagnosis Code    Postpartum care following  delivery Z39.2    Breast mass, right N63.10        30year old woman with right breast mass. We discussed the area of palpable concern is most likely a sebaceous cyst.  I have offered excision, but recommended waiting until she is no longer pregnant as manipulating the breast and anesthesia can induce labor. We specifically discussed increased risk of bleeding as well as possibility of milk duct fistula when operating on pregnant or lactating breasts. Risks, benefits and options were discussed in detail to include, but not limited to, bleeding, infection, risks of anesthesia, injury to surrounding structures and other unforeseen events such as stroke, heart attack or death. MsTorey Pool Dawson will let us know when she would like to proceed with excision. She was asked to call with any questions or concerns.

## 2019-09-05 ENCOUNTER — HOSPITAL ENCOUNTER (INPATIENT)
Age: 34
LOS: 2 days | Discharge: HOME | End: 2019-09-07
Attending: OBSTETRICS & GYNECOLOGY | Admitting: OBSTETRICS & GYNECOLOGY
Payer: COMMERCIAL

## 2019-09-05 ENCOUNTER — ANESTHESIA EVENT (OUTPATIENT)
Dept: LABOR AND DELIVERY | Age: 34
End: 2019-09-05
Payer: COMMERCIAL

## 2019-09-05 ENCOUNTER — ANESTHESIA (OUTPATIENT)
Dept: LABOR AND DELIVERY | Age: 34
End: 2019-09-05
Payer: COMMERCIAL

## 2019-09-05 PROBLEM — O34.219 HISTORY OF CESAREAN DELIVERY AFFECTING PREGNANCY: Status: ACTIVE | Noted: 2019-09-05

## 2019-09-05 LAB
ABO + RH BLD: NORMAL
BASOPHILS # BLD: 0 K/UL (ref 0–0.1)
BASOPHILS NFR BLD: 0 % (ref 0–2)
BLOOD GROUP ANTIBODIES SERPL: NORMAL
DIFFERENTIAL METHOD BLD: ABNORMAL
EOSINOPHIL # BLD: 0.2 K/UL (ref 0–0.4)
EOSINOPHIL NFR BLD: 2 % (ref 0–5)
ERYTHROCYTE [DISTWIDTH] IN BLOOD BY AUTOMATED COUNT: 13.5 % (ref 11.6–14.5)
HCT VFR BLD AUTO: 37.6 % (ref 35–45)
HGB BLD-MCNC: 12.6 G/DL (ref 12–16)
LYMPHOCYTES # BLD: 2.8 K/UL (ref 0.9–3.6)
LYMPHOCYTES NFR BLD: 22 % (ref 21–52)
MCH RBC QN AUTO: 31.3 PG (ref 24–34)
MCHC RBC AUTO-ENTMCNC: 33.5 G/DL (ref 31–37)
MCV RBC AUTO: 93.5 FL (ref 74–97)
MONOCYTES # BLD: 0.8 K/UL (ref 0.05–1.2)
MONOCYTES NFR BLD: 7 % (ref 3–10)
NEUTS SEG # BLD: 8.6 K/UL (ref 1.8–8)
NEUTS SEG NFR BLD: 69 % (ref 40–73)
PLATELET # BLD AUTO: 206 K/UL (ref 135–420)
PMV BLD AUTO: 10.4 FL (ref 9.2–11.8)
RBC # BLD AUTO: 4.02 M/UL (ref 4.2–5.3)
SPECIMEN EXP DATE BLD: NORMAL
WBC # BLD AUTO: 12.5 K/UL (ref 4.6–13.2)

## 2019-09-05 PROCEDURE — 77030007866 HC KT SPN ANES BBMI -B: Performed by: ANESTHESIOLOGY

## 2019-09-05 PROCEDURE — 76060000078 HC EPIDURAL ANESTHESIA: Performed by: OBSTETRICS & GYNECOLOGY

## 2019-09-05 PROCEDURE — 77030040361 HC SLV COMPR DVT MDII -B

## 2019-09-05 PROCEDURE — 75410000003 HC RECOV DEL/VAG/CSECN EA 0.5 HR

## 2019-09-05 PROCEDURE — 77030039266 HC ADH SKN EXOFIN S2SG -A: Performed by: OBSTETRICS & GYNECOLOGY

## 2019-09-05 PROCEDURE — 74011000250 HC RX REV CODE- 250

## 2019-09-05 PROCEDURE — 75410000003 HC RECOV DEL/VAG/CSECN EA 0.5 HR: Performed by: OBSTETRICS & GYNECOLOGY

## 2019-09-05 PROCEDURE — 76010000391 HC C SECN FIRST 1 HR: Performed by: OBSTETRICS & GYNECOLOGY

## 2019-09-05 PROCEDURE — 74011250636 HC RX REV CODE- 250/636

## 2019-09-05 PROCEDURE — 77030031139 HC SUT VCRL2 J&J -A: Performed by: OBSTETRICS & GYNECOLOGY

## 2019-09-05 PROCEDURE — 77030034849

## 2019-09-05 PROCEDURE — 77030002933 HC SUT MCRYL J&J -A: Performed by: OBSTETRICS & GYNECOLOGY

## 2019-09-05 PROCEDURE — 65270000029 HC RM PRIVATE

## 2019-09-05 PROCEDURE — 77030018836 HC SOL IRR NACL ICUM -A

## 2019-09-05 PROCEDURE — 86900 BLOOD TYPING SEROLOGIC ABO: CPT

## 2019-09-05 PROCEDURE — 74011250636 HC RX REV CODE- 250/636: Performed by: NURSE ANESTHETIST, CERTIFIED REGISTERED

## 2019-09-05 PROCEDURE — 85025 COMPLETE CBC W/AUTO DIFF WBC: CPT

## 2019-09-05 PROCEDURE — 77030002974 HC SUT PLN J&J -A: Performed by: OBSTETRICS & GYNECOLOGY

## 2019-09-05 PROCEDURE — 74011250636 HC RX REV CODE- 250/636: Performed by: OBSTETRICS & GYNECOLOGY

## 2019-09-05 PROCEDURE — 77010026065 HC OXYGEN MINIMUM MEDICAL AIR

## 2019-09-05 RX ORDER — BUPIVACAINE HYDROCHLORIDE 7.5 MG/ML
INJECTION, SOLUTION INTRASPINAL
Status: COMPLETED | OUTPATIENT
Start: 2019-09-05 | End: 2019-09-05

## 2019-09-05 RX ORDER — OXYCODONE AND ACETAMINOPHEN 5; 325 MG/1; MG/1
1-2 TABLET ORAL
Status: DISCONTINUED | OUTPATIENT
Start: 2019-09-05 | End: 2019-09-07 | Stop reason: HOSPADM

## 2019-09-05 RX ORDER — BUPIVACAINE HYDROCHLORIDE 7.5 MG/ML
INJECTION, SOLUTION INTRASPINAL AS NEEDED
Status: DISCONTINUED | OUTPATIENT
Start: 2019-09-05 | End: 2019-09-05 | Stop reason: HOSPADM

## 2019-09-05 RX ORDER — IBUPROFEN 400 MG/1
800 TABLET ORAL
Status: DISCONTINUED | OUTPATIENT
Start: 2019-09-08 | End: 2019-09-06

## 2019-09-05 RX ORDER — ACETAMINOPHEN 325 MG/1
650 TABLET ORAL
Status: DISCONTINUED | OUTPATIENT
Start: 2019-09-05 | End: 2019-09-07 | Stop reason: HOSPADM

## 2019-09-05 RX ORDER — KETOROLAC TROMETHAMINE 30 MG/ML
INJECTION, SOLUTION INTRAMUSCULAR; INTRAVENOUS AS NEEDED
Status: DISCONTINUED | OUTPATIENT
Start: 2019-09-05 | End: 2019-09-05 | Stop reason: HOSPADM

## 2019-09-05 RX ORDER — KETOROLAC TROMETHAMINE 30 MG/ML
30 INJECTION, SOLUTION INTRAMUSCULAR; INTRAVENOUS EVERY 6 HOURS
Status: DISCONTINUED | OUTPATIENT
Start: 2019-09-06 | End: 2019-09-06

## 2019-09-05 RX ORDER — HYDROCORTISONE 25 MG/G
CREAM TOPICAL AS NEEDED
Status: DISCONTINUED | OUTPATIENT
Start: 2019-09-05 | End: 2019-09-07 | Stop reason: HOSPADM

## 2019-09-05 RX ORDER — SODIUM CHLORIDE, SODIUM LACTATE, POTASSIUM CHLORIDE, CALCIUM CHLORIDE 600; 310; 30; 20 MG/100ML; MG/100ML; MG/100ML; MG/100ML
125 INJECTION, SOLUTION INTRAVENOUS CONTINUOUS
Status: DISPENSED | OUTPATIENT
Start: 2019-09-05 | End: 2019-09-06

## 2019-09-05 RX ORDER — EPHEDRINE SULFATE/0.9% NACL/PF 25 MG/5 ML
SYRINGE (ML) INTRAVENOUS AS NEEDED
Status: DISCONTINUED | OUTPATIENT
Start: 2019-09-05 | End: 2019-09-05 | Stop reason: HOSPADM

## 2019-09-05 RX ORDER — OXYTOCIN/RINGER'S LACTATE 20/1000 ML
125 PLASTIC BAG, INJECTION (ML) INTRAVENOUS CONTINUOUS
Status: DISCONTINUED | OUTPATIENT
Start: 2019-09-05 | End: 2019-09-07 | Stop reason: HOSPADM

## 2019-09-05 RX ORDER — DIPHENHYDRAMINE HYDROCHLORIDE 50 MG/ML
25 INJECTION, SOLUTION INTRAMUSCULAR; INTRAVENOUS
Status: DISCONTINUED | OUTPATIENT
Start: 2019-09-05 | End: 2019-09-07 | Stop reason: HOSPADM

## 2019-09-05 RX ORDER — MORPHINE SULFATE 1 MG/ML
INJECTION, SOLUTION EPIDURAL; INTRATHECAL; INTRAVENOUS AS NEEDED
Status: DISCONTINUED | OUTPATIENT
Start: 2019-09-05 | End: 2019-09-05 | Stop reason: HOSPADM

## 2019-09-05 RX ORDER — ONDANSETRON 2 MG/ML
4 INJECTION INTRAMUSCULAR; INTRAVENOUS
Status: DISCONTINUED | OUTPATIENT
Start: 2019-09-05 | End: 2019-09-07 | Stop reason: HOSPADM

## 2019-09-05 RX ORDER — PROMETHAZINE HYDROCHLORIDE 25 MG/ML
25 INJECTION, SOLUTION INTRAMUSCULAR; INTRAVENOUS
Status: DISCONTINUED | OUTPATIENT
Start: 2019-09-05 | End: 2019-09-07 | Stop reason: HOSPADM

## 2019-09-05 RX ORDER — SODIUM CHLORIDE 0.9 % (FLUSH) 0.9 %
5-40 SYRINGE (ML) INJECTION EVERY 8 HOURS
Status: DISCONTINUED | OUTPATIENT
Start: 2019-09-05 | End: 2019-09-06

## 2019-09-05 RX ORDER — SODIUM CHLORIDE, SODIUM LACTATE, POTASSIUM CHLORIDE, CALCIUM CHLORIDE 600; 310; 30; 20 MG/100ML; MG/100ML; MG/100ML; MG/100ML
INJECTION, SOLUTION INTRAVENOUS
Status: DISCONTINUED | OUTPATIENT
Start: 2019-09-05 | End: 2019-09-05 | Stop reason: HOSPADM

## 2019-09-05 RX ORDER — NALBUPHINE HYDROCHLORIDE 10 MG/ML
5 INJECTION, SOLUTION INTRAMUSCULAR; INTRAVENOUS; SUBCUTANEOUS
Status: ACTIVE | OUTPATIENT
Start: 2019-09-05 | End: 2019-09-06

## 2019-09-05 RX ORDER — OXYTOCIN 10 [USP'U]/ML
INJECTION, SOLUTION INTRAMUSCULAR; INTRAVENOUS AS NEEDED
Status: DISCONTINUED | OUTPATIENT
Start: 2019-09-05 | End: 2019-09-05 | Stop reason: HOSPADM

## 2019-09-05 RX ORDER — FACIAL-BODY WIPES
10 EACH TOPICAL
Status: DISCONTINUED | OUTPATIENT
Start: 2019-09-05 | End: 2019-09-07 | Stop reason: HOSPADM

## 2019-09-05 RX ORDER — NALOXONE HYDROCHLORIDE 0.4 MG/ML
0.2 INJECTION, SOLUTION INTRAMUSCULAR; INTRAVENOUS; SUBCUTANEOUS
Status: ACTIVE | OUTPATIENT
Start: 2019-09-05 | End: 2019-09-06

## 2019-09-05 RX ORDER — SIMETHICONE 80 MG
80 TABLET,CHEWABLE ORAL
Status: DISCONTINUED | OUTPATIENT
Start: 2019-09-05 | End: 2019-09-07 | Stop reason: HOSPADM

## 2019-09-05 RX ORDER — SODIUM CHLORIDE 0.9 % (FLUSH) 0.9 %
5-40 SYRINGE (ML) INJECTION AS NEEDED
Status: DISCONTINUED | OUTPATIENT
Start: 2019-09-05 | End: 2019-09-07 | Stop reason: HOSPADM

## 2019-09-05 RX ORDER — CEFAZOLIN SODIUM 2 G/50ML
2 SOLUTION INTRAVENOUS ONCE
Status: COMPLETED | OUTPATIENT
Start: 2019-09-05 | End: 2019-09-05

## 2019-09-05 RX ADMIN — Medication 5 MG: at 18:11

## 2019-09-05 RX ADMIN — MORPHINE SULFATE 0.15 MG: 1 INJECTION, SOLUTION EPIDURAL; INTRATHECAL; INTRAVENOUS at 18:08

## 2019-09-05 RX ADMIN — BUPIVACAINE HYDROCHLORIDE 12 MG: 7.5 INJECTION, SOLUTION INTRASPINAL at 18:08

## 2019-09-05 RX ADMIN — DIPHENHYDRAMINE HYDROCHLORIDE 25 MG: 50 INJECTION, SOLUTION INTRAMUSCULAR; INTRAVENOUS at 20:34

## 2019-09-05 RX ADMIN — BUPIVACAINE HYDROCHLORIDE 1.4 ML: 7.5 INJECTION, SOLUTION INTRASPINAL at 18:08

## 2019-09-05 RX ADMIN — SODIUM CHLORIDE, SODIUM LACTATE, POTASSIUM CHLORIDE, CALCIUM CHLORIDE: 600; 310; 30; 20 INJECTION, SOLUTION INTRAVENOUS at 18:22

## 2019-09-05 RX ADMIN — Medication 5 MG: at 18:19

## 2019-09-05 RX ADMIN — KETOROLAC TROMETHAMINE 30 MG: 30 INJECTION, SOLUTION INTRAMUSCULAR; INTRAVENOUS at 18:45

## 2019-09-05 RX ADMIN — CEFAZOLIN SODIUM 2 G: 2 SOLUTION INTRAVENOUS at 18:11

## 2019-09-05 RX ADMIN — OXYTOCIN 20 UNITS: 10 INJECTION, SOLUTION INTRAMUSCULAR; INTRAVENOUS at 18:30

## 2019-09-05 RX ADMIN — Medication 125 ML/HR: at 20:30

## 2019-09-05 RX ADMIN — SODIUM CHLORIDE, SODIUM LACTATE, POTASSIUM CHLORIDE, CALCIUM CHLORIDE: 600; 310; 30; 20 INJECTION, SOLUTION INTRAVENOUS at 18:00

## 2019-09-05 NOTE — ROUTINE PROCESS
Pt arrived ambulatory from office. Pt arrived for section. Pt denies questions/concerns/pain. 1635 - 20G PIV inserted in right forearm for blood. Blood drawn and sent to lab.  1800 - Pt ambulated to [facility] 150 - pt out of OR and in recovery  1915 - Bedside and Verbal shift change report given to Leti Cabrera RN (oncoming nurse) by Alphonso Flores RN (offgoing nurse). Report included the following information SBAR, OR Summary, Intake/Output, MAR and Recent Results.

## 2019-09-05 NOTE — H&P
History & Physical    Name: Laura Booth MRN: [id number]  SSN: [id number]    YOB: 1985  Age: 30 y.o. Sex: female      Subjective:     Estimated Date of Delivery: 9/15/19  OB History        2    Para   1    Term   1            AB        Living   1       SAB        TAB        Ectopic        Molar        Multiple   0    Live Births   1              G1 - 17 - 1LTCS at 38wks for IUGR/breech, IVF  G2 - current    35yo  at 38.4wks with DARRION 9/15/19 by LMP c/w 8.0wk US. She has followed with [address] [place] [address],[address] for prenatal care. She was seen in the office today, a growth US revealed newly diagnosed IUGR 9% and low baseline FHR 110s with moderate variability, no decels. Discussed IOL vs. RCS with pt, and the joint decision was made to proceed with RCS due to concerns for fetal well being and long induction. She denies ctxs, VB, LOF, fevers, chills, N/V/D, HA, visual changes, RUQ/epigastric pain. Decreased FM today. PAST MEDICAL HISTORY  PCOS    Past Surgical History:   Procedure Laterality Date    HX  SECTION      HX WISDOM TEETH EXTRACTION       SOCIAL HISTORY  Denies tobacco, EtOH, illicit drug use. .     FAMILY HISTORY  Depression - mother    Allergies   Allergen Reactions    Ceclor [Cefaclor] Rash     MEDICATIONS  Prenatal vitamin po daily    Review of Systems: negative other than HPI    Objective:     Vitals:  Vitals:    19 1629   Resp: 18   Temp: 98.5 °F (36.9 °C)        Physical Exam:  Gen:  A&O, NAD  CV:  RRR  Pulm:  CTAB  Abd:  Soft, gravid, nttp  Ext:  No clubbing/cyanosis/edema/redness, nttp  Cervix:  2cm in office  Membranes:  Intact    TOCO:  q4-7min, irregular pattern  FHTs:  110s, mod variability, +accels, no decels    US in office today:  EFW 2739g, 9%, anterior placenta, normal dopplers, BPP 8/10 (-2 NST for low baseline)    Prenatal Labs:   Lab Results   Component Value Date/Time    Rubella, External immune 2016    GrBStrep, External negative 06/15/2017    HBsAg, External neg 2016    HIV, External neg 2016    RPR, External non-reactive 2016     GBS neg    Impression/Plan:     33yo  at 38.4wks with IUGR 9%, h/o CS x 1, desires RCS, low FHR baseline, decreased FM. Discussed the risks of surgery including the risks of bleeding, possible need for blood transfusion (HIV 1/1,000,000, Hep C 1/250,000), infection, deep vein thrombosis, injury to other organs/baby/nerves/vessels, anesthesia complications, and need for other indicated procedures. The patient voices an understanding and consents to proceed. Pt with h/o allergy to Ceclor causing a rash. Pt did not have reaction to medication given at last CS, but unsure what she received. She desires to try Ancef as has never had a reaction to other medications. Reviewed with anesthesia who agrees to proceed. Pt denies h/o anaphylaxis.          Signed By:  Brandi Myers MD     2019

## 2019-09-05 NOTE — OP NOTES
Section Operative Note      Name: [address] [place] [address] Record Number: [id number]   Today's Date: 2019      PREOP DIAGNOSIS:   1.  30 y. o.yo  at 38.4wks  2. IUGR 9%  3. History of LTCS, declines TOLAC  POSTOP DIAGNOSIS: Same    PROCEDURE: Primary low transverse  section    SURGEON:Inna Michael MD    ASSISTANT: Mary Moreland SA  ANESTHESIA: Epidural    EBL: 600mL  IVF:  1400mL LR  UOP:  100mL clear yellow urine    ANTIBIOTICS: Ancef 2g  COMPLICATIONS: none  IMPLANTS:  none  CONDITION: stable to recovery    FETAL DESCRIPTION: hubbard male    BIRTH INFORMATION: This patient has no babies on file. OPERATIVE FINDINGS:      At the time of the surgery a live Male delivered  with an APGAR of  8 and 9 at 1 and 5 minutes respectively. Birth weight was pending  Amniotic fluid was clear, moderate amount. Placenta intact with a 3 vessel cord. Inspection of the uterus, fallopian tubes and ovaries revealed normal anatomy. Thick adhesions of rectus muscles to fascia and peritoneum. Bladder adhered high on uterus. Thin low uterine segment. PROCEDURE:    Informed consent was obtained and risks discussed including risk of damage to bowel, bladder, nerves and blood vessels. Consent was obtained for blood transfusion in the case of emergency. The patient was taken to the operating room, where spinal anesthesia was found to be adequate. The patient was prepped and draped in the normal sterile fashion in the supine position. A Pfannenstiel skin incision was made with the scalpel and carried down to the underlying layer of fascia which was incised in the midline. The fascial incision was extended laterally with the curved Santillan scissors. The superior aspect of the fascial incision was grasped with Kocher clamps, elevated, and the underlying rectus muscles were dissected off bluntly and with Santillan scissors.  The inferior aspect of the fascia was grasped with the Kocher clamps, elevated, and the underlying rectus muscles were dissected off in a similar fashion. The rectus muscles were  in the midline by sharp dissection through dense adhesions of rectus muscles to fascia and peritoneum taking care to avoid injuring any underlying structures. The peritoneum was entered bluntly, then spread by pulling superiorly and laterally after continued careful lysis of adhesions. The bladder blade was inserted. The vesicouterine peritoneum was adhered high on uterus, tented, entered sharply with the Metzebaum scissors, then extended laterally. The bladder flap was created digitally, and the bladder blade was reinserted. A low transverse uterine incision was made with the scalpel and extended by pulling cephalad and caudad. There was clear amniotic fluid. The babys head was delivered atraumatically followed by the body without difficulty. The crying infant's nose and mouth were suctioned with bulb suction. The cord was clamped and cut and the crying infant was handed off to the waiting pediatricians. The placenta was expressed with uterine massage and gentle traction. The uterus was exteriorized and cleared of all clots and debris. The uterine incision was closed in two layers. The first layer with running locked layer of 0 Vicryl. The second layer was an imbricating layer of 0 Monocryl with excellent hemostasis. The posterior cul-de-sac was cleared of all clots and debris. The uterus was returned to the pelvis. The paracolic gutters were cleared of all clots and debris. The uterine incision was reinspected and found to be hemostatic. The fascia was closed with 0 Vicryl in a running fashion. The subcuticular layers were irrigated, then suctioned. The bovie was used for hemostasis. The subcutaneous tissue was then reapproximated with 2-0 plain gut in interrupted fashion. The skin was closed with a 4-0 monocryl in a running, subcuticular fashion.  Dermabond was placed on the incision. The patient tolerated the procedure well. Sponge, lap, and needle counts were correct times two and the patient was taken to recovery in stable condition.           Signed By:  Reida Duverney, MD     September 5, 2019

## 2019-09-05 NOTE — ANESTHESIA PROCEDURE NOTES
Spinal Block    Start time: 9/5/2019 6:00 PM  End time: 9/5/2019 6:12 PM  Performed by: Karen Harley MD  Authorized by: Karen Harley MD     Pre-procedure:  Preanesthetic Checklist: patient identified, risks and benefits discussed, anesthesia consent, site marked, patient being monitored and timeout performed    Timeout Time: 18:01          Spinal Block:   Patient Position:  Seated  Prep Region:  Lumbar  Prep: Betadine      Location:  L3-4  Technique:  Single shot    Local Dose (mL):  1.4    Needle:   Needle Type:  Quincke  Needle Gauge:  25 G  Attempts:  1            Assessment:  Insertion:  Uncomplicated  Patient tolerance:  Patient tolerated the procedure well with no immediate complications

## 2019-09-05 NOTE — L&D DELIVERY NOTE
Delivery Summary    Patient: Noe Sharma MRN: [id number]  SSN: [id number]    YOB: 1985  Age: [address] y.o. Sex: female        Information for the patient's :  Daniella Wall [[id number]]       Labor Events:    Labor: No    Steroids: None   Cervical Ripening Date/Time:       Cervical Ripening Type: None   Antibiotics During Labor: No   Rupture Identifier: Sac 1    Rupture Date/Time: 2019 6:29 PM   Rupture Type: AROM   Amniotic Fluid Volume:      Amniotic Fluid Description: Clear    Amniotic Fluid Odor: None    Induction:         Induction Date/Time:        Indications for Induction:      Augmentation:     Augmentation Date/Time:      Indications for Augmentation:     Labor complications: Additional complications:        Delivery Events:  Indications For Episiotomy:     Episiotomy:     Perineal Laceration(s):     Repaired:     Periurethral Laceration Location:      Repaired:     Labial Laceration Location:     Repaired:     Sulcal Laceration Location:     Repaired:     Vaginal Laceration Location:     Repaired:     Cervical Laceration Location:     Repaired:     Repair Suture:     Number of Repair Packets:     Estimated Blood Loss (ml):  ml     Delivery Date: 2019    Delivery Time: 6:29 PM   Delivery Type:       Details    Trial of Labor:     Primary/Repeat:     Priority:     Indications:          Sex:  Male     Gestational Age: 38w3d  Delivery Clinician:  Koko Michael  Living Status:     Delivery Location:              APGARS  One minute Five minutes Ten minutes   Skin color:            Heart rate:            Grimace:            Muscle tone:            Breathing: Totals:              Presentation: Vertex    Position:   Occiput Transverse  Resuscitation Method:        Meconium Stained:        Cord Information: 3 Vessels  Complications: None  Cord around:    Delayed cord clamping?  No  Cord clamped date/time:   Disposition of Cord Blood: Lab    Blood Gases Sent?: No    Placenta:  Date/Time:    Removal:        Appearance:        Measurements:  Birth Weight:        Birth Length:        Head Circumference:        Chest Circumference:       Abdominal Girth:       Other Providers:   Marisabel OREILLY;JOSE BLANK;SEVERO MORALES;;;CARMEN GILMAN;SHERLEY ESTEBAN;DAY INGRAM, Obstetrician;Primary Nurse;Primary  Nurse;Nicu Nurse;Neonatologist;Anesthesiologist;Crna;Pediatrician;Midwife;Nursery Nurse             Group B Strep:   Lab Results   Component Value Date/Time    GrBStrep, External negative 06/15/2017     Information for the patient's :  Spangler Most [[id number]]   No results found for: Jennifer Treadwell, KOFI, ANDREW, ABORHEXT, [address]

## 2019-09-05 NOTE — ANESTHESIA PREPROCEDURE EVALUATION
Anesthetic History   No history of anesthetic complications            Review of Systems / Medical History  Patient summary reviewed, nursing notes reviewed and pertinent labs reviewed    Pulmonary  Within defined limits                 Neuro/Psych              Cardiovascular  Within defined limits                Exercise tolerance: >4 METS     GI/Hepatic/Renal  Within defined limits              Endo/Other  Within defined limits           Other Findings   Comments:   Risk Factors for Postoperative nausea/vomiting:       History of postoperative nausea/vomiting? NO       Female? YES       Motion sickness? NO       Intended opioid administration for postoperative analgesia? NO      Smoking Abstinence  Current Smoker? NO  Elective Surgery? NO  Seen preoperatively by anesthesiologist or proxy prior to day of surgery? YES  Pt abstained from smoking 24 hours prior to anesthesia?  YES           Physical Exam    Airway  Mallampati: I  TM Distance: 4 - 6 cm  Neck ROM: normal range of motion   Mouth opening: Normal     Cardiovascular    Rhythm: regular  Rate: normal         Dental  No notable dental hx       Pulmonary  Breath sounds clear to auscultation               Abdominal  GI exam deferred       Other Findings            Anesthetic Plan    ASA: 2, emergent  Anesthesia type: spinal          Induction: Intravenous  Anesthetic plan and risks discussed with: Patient

## 2019-09-06 LAB
HCT VFR BLD AUTO: 31.3 % (ref 35–45)
HGB BLD-MCNC: 10.2 G/DL (ref 12–16)

## 2019-09-06 PROCEDURE — 65270000029 HC RM PRIVATE

## 2019-09-06 PROCEDURE — 74011250636 HC RX REV CODE- 250/636: Performed by: OBSTETRICS & GYNECOLOGY

## 2019-09-06 PROCEDURE — 74011250636 HC RX REV CODE- 250/636: Performed by: NURSE ANESTHETIST, CERTIFIED REGISTERED

## 2019-09-06 PROCEDURE — 77030036554

## 2019-09-06 PROCEDURE — 85014 HEMATOCRIT: CPT

## 2019-09-06 PROCEDURE — 77030027138 HC INCENT SPIROMETER -A

## 2019-09-06 PROCEDURE — 85018 HEMOGLOBIN: CPT

## 2019-09-06 PROCEDURE — 36415 COLL VENOUS BLD VENIPUNCTURE: CPT

## 2019-09-06 RX ORDER — IBUPROFEN 400 MG/1
800 TABLET ORAL
Status: DISCONTINUED | OUTPATIENT
Start: 2019-09-06 | End: 2019-09-07 | Stop reason: HOSPADM

## 2019-09-06 RX ADMIN — KETOROLAC TROMETHAMINE 30 MG: 30 INJECTION, SOLUTION INTRAMUSCULAR at 00:36

## 2019-09-06 RX ADMIN — SODIUM CHLORIDE, SODIUM LACTATE, POTASSIUM CHLORIDE, AND CALCIUM CHLORIDE 125 ML/HR: 600; 310; 30; 20 INJECTION, SOLUTION INTRAVENOUS at 10:26

## 2019-09-06 RX ADMIN — Medication 10 ML: at 18:53

## 2019-09-06 RX ADMIN — KETOROLAC TROMETHAMINE 30 MG: 30 INJECTION, SOLUTION INTRAMUSCULAR at 06:33

## 2019-09-06 RX ADMIN — KETOROLAC TROMETHAMINE 30 MG: 30 INJECTION, SOLUTION INTRAMUSCULAR at 12:47

## 2019-09-06 RX ADMIN — KETOROLAC TROMETHAMINE 30 MG: 30 INJECTION, SOLUTION INTRAMUSCULAR at 18:54

## 2019-09-06 RX ADMIN — DIPHENHYDRAMINE HYDROCHLORIDE 25 MG: 50 INJECTION, SOLUTION INTRAMUSCULAR; INTRAVENOUS at 12:46

## 2019-09-06 RX ADMIN — Medication 10 ML: at 12:52

## 2019-09-06 RX ADMIN — DIPHENHYDRAMINE HYDROCHLORIDE 25 MG: 50 INJECTION, SOLUTION INTRAMUSCULAR; INTRAVENOUS at 00:40

## 2019-09-06 NOTE — PROGRESS NOTES
Baby announcement.     [address] [facility]   Staff [address] [facility]   [phone]

## 2019-09-06 NOTE — PROGRESS NOTES
CS Rounding Note    Patient: Luis A Schwartz MRN: [id number]  SSN: [id number]    YOB: 1985  Age: 30 y.o. Sex: female        Patient without complaints. No ambulation yet, liang remains in place. Tolerating liquids, minimal lochia, pain well controlled with po pain meds. +flatus. She is breastfeeding without difficulty.       Patient Vitals for the past 12 hrs:   Temp Pulse Resp BP SpO2   09/06/19 0303 98.3 °F (36.8 °C) 70 18 110/70 100 %   09/05/19 2153 98.2 °F (36.8 °C) 73 18 109/72 100 %   09/05/19 2102 -- 72 19 -- 100 %   09/05/19 2101 -- 78 18 -- 100 %   09/05/19 2100 -- 73 15 -- 100 %   09/05/19 2059 -- 68 15 -- 100 %   09/05/19 2058 -- 77 17 -- 100 %   09/05/19 2057 -- 76 16 -- 100 %   09/05/19 2056 -- 75 25 -- 100 %   09/05/19 2055 -- 84 18 -- --   09/05/19 2054 -- 77 (!) 36 -- --   09/05/19 2053 -- 78 15 -- 100 %   09/05/19 2052 -- 81 13 -- 100 %   09/05/19 2051 -- 77 18 -- 100 %   09/05/19 2050 -- 78 13 -- 100 %   09/05/19 2049 -- 78 12 -- 100 %   09/05/19 2048 -- 77 13 -- 100 %   09/05/19 2047 -- 79 19 -- 100 %   09/05/19 2046 -- 76 (!) 40 93/61 100 %   09/05/19 2045 -- 83 27 -- 100 %   09/05/19 2044 -- 73 18 -- 100 %   09/05/19 2043 -- 64 18 -- 100 %   09/05/19 2042 -- 69 19 -- 100 %   09/05/19 2041 -- 68 13 -- 100 %   09/05/19 2040 -- 88 (!) 54 -- 100 %   09/05/19 2039 -- 72 20 -- 100 %   09/05/19 2038 -- 74 23 -- 100 %   09/05/19 2037 -- 61 12 -- 100 %   09/05/19 2036 -- 64 14 -- 100 %   09/05/19 2035 -- 67 10 -- 99 %   09/05/19 2034 -- 71 10 -- 100 %   09/05/19 2033 -- 61 24 -- 100 %   09/05/19 2032 -- 73 13 -- 100 %   09/05/19 2031 -- 74 24 99/60 99 %   09/05/19 2030 -- 74 (!) 35 -- 100 %   09/05/19 2029 -- 73 21 -- 100 %   09/05/19 2028 -- 78 17 -- 100 %   09/05/19 2027 -- 85 16 -- 100 %   09/05/19 2026 -- 78 10 -- 100 %   09/05/19 2025 -- 75 15 -- 99 %   09/05/19 2024 -- 86 18 -- 99 %   09/05/19 2023 -- 69 19 -- 98 %   09/05/19 2022 -- 71 22 -- 99 %   09/05/19 2021 -- 75 24 -- 100 %   09/05/19 2020 -- 75 (!) 35 -- 99 %   09/05/19 2019 -- 74 13 -- 100 %   09/05/19 2018 -- -- 20 -- 99 %   09/05/19 2017 -- 79 18 -- 99 %   09/05/19 2016 -- 67 15 103/70 99 %   09/05/19 2015 -- 77 16 -- 99 %   09/05/19 2014 -- 74 19 -- 99 %   09/05/19 2013 -- 81 19 -- 99 %   09/05/19 2012 -- 73 15 -- 97 %   09/05/19 2011 -- 81 -- -- 99 %   09/05/19 2010 -- 73 18 -- 99 %   09/05/19 2009 -- 77 27 -- 99 %   09/05/19 2008 -- 80 12 -- 99 %   09/05/19 2007 -- 68 20 -- 98 %   09/05/19 2006 -- 74 19 -- 98 %   09/05/19 2005 -- 73 15 -- 99 %   09/05/19 2004 -- 72 16 -- 98 %   09/05/19 2003 -- 79 19 -- 99 %   09/05/19 2002 -- 78 16 -- 98 %   09/05/19 2001 -- 78 20 107/70 99 %   09/05/19 2000 -- 77 19 -- 98 %   09/05/19 1959 -- 78 13 -- 98 %   09/05/19 1958 -- 75 21 -- 98 %   09/05/19 1957 -- 78 25 -- 98 %   09/05/19 1956 -- 74 12 -- 97 %   09/05/19 1955 -- 69 13 -- 98 %   09/05/19 1954 -- 75 15 -- 97 %   09/05/19 1953 -- 69 19 -- 97 %   09/05/19 1952 -- 79 12 -- 97 %   09/05/19 1951 -- 86 18 -- 98 %   09/05/19 1950 -- 88 23 -- 97 %   09/05/19 1949 -- 82 28 -- 97 %   09/05/19 1948 -- 86 16 -- 98 %   09/05/19 1947 -- 91 24 -- 97 %   09/05/19 1946 -- 90 21 -- 98 %   09/05/19 1945 -- 82 10 -- 97 %   09/05/19 1944 -- 83 16 -- 97 %   09/05/19 1943 -- 74 19 -- 97 %       EXAM:  General:  Alert and oriented, no apparent distress  CV: RRR  Pulm:  CTAB  Abdomen:  Soft, non distended, appropriately tender to palpation, firm fundus below umbilicus, incision clean/dry/intact  Lower extremities bilaterally:  Non tender to palpation, no clubbing/cyanosis/edema/redness, SCDs on    IMPRESSION: 30 y.o. [id number] POD#1 s/p RCS, IUGR    PLAN:   - Recovering well, pain controlled, routine postop care  - Maintain liang until after breakfast given bladder edema after RIMMA.   - Continue to monitor at this time, ADAT, ambulate with assistance    Graciela Duran MD  September 6, 2019  7:42 AM

## 2019-09-06 NOTE — ANESTHESIA POSTPROCEDURE EVALUATION
Procedure(s):   SECTION.     spinal    Anesthesia Post Evaluation      Multimodal analgesia: multimodal analgesia used between 6 hours prior to anesthesia start to PACU discharge  Patient location during evaluation: bedside  Patient participation: complete - patient participated  Level of consciousness: awake and alert  Pain score: 0  Airway patency: patent  Anesthetic complications: no  Cardiovascular status: stable  Respiratory status: room air  Hydration status: stable  Post anesthesia nausea and vomiting:  none      Vitals Value Taken Time   BP 93/61 2019  8:46 PM   Temp 36.2 °C (97.2 °F) 2019  7:00 PM   Pulse 72 2019  9:02 PM   Resp 19 2019  9:02 PM   SpO2 100 % 2019  9:02 PM

## 2019-09-06 NOTE — ROUTINE PROCESS
Bedside and Verbal shift change report given to Mireya 812 (oncoming nurse) by Freda Szymanski RN (offgoing nurse). Report included the following information SBAR, Intake/Output, MAR, Recent Results and Med Rec Status. 2025: Assumed care of pt sitting up in chair, introduction to pt and , reviewed plan of care. Assessment and vitals completed. Denies needs or c/o at this time. No apparent distress note  at bedside. 2210: Sleeping supine respirations unlabored ,  at bedside. 0015: Breast feeding eduction provided, by LIN Obrien RN, infant latched and nursing. Lanolin given for nipple pain. No apparent distress noted. 0100: C/o pain medicated with 800 mg of motrin per orders see mar, hydrogel pads given for breast pain, instructions give to wash breast before breast feeding, verbalizes understanding. 0400; Sleeping supine no apparent distress noted.  at bedside. 3018: Assessment and vitals within defined limits. C/o pain medicated with 1 percocet per orders see mar. Denies needs or c/o nad noted.  at bedside. Assessment and vitals within defined limits. Voiding and ambulating without difficulty. Pain controlled by motrin and percocet.

## 2019-09-06 NOTE — PROGRESS NOTES
Sbar report received from [address] [place] [address] which included MAR, kardex, procedure summary and current plan of care. Bedside introductions given. White board updated. 0000 Pt is resting with eyes closed.  No complaint of pain or discomfort.  0300 Family bonding well.  0600 I&O appropriate  0700 SBAR report given to oncoming shift

## 2019-09-06 NOTE — ROUTINE PROCESS
Verbal shift change report given to Chapo Freeman RN (oncoming nurse) by BRIDGETT Cristobal (offgoing nurse). Report included the following information Kardex, Intake/Output, MAR and Recent Results. 1005--assessment done  1010--SCDs off--Alonzo  cath removed--madhav care done--assisted out of bed to the bathroom--self AM care--linens changed--advised to call for assistance when ready to get up to the bathroom again--up in room--SCDs to remain off--po fluids encouraged  1100--assisted up to bathroom--voided 550cc--tolerated well--madhav care demonstrated  1215--Instructed on the use of Incentive Spirometer--voided another 450cc. 1845--vital signs stable--B/P runs low--voiding qs--effective pain management with Toradol and Duramorph--discussed oral pain medication options--breast feeding is a work in progress. 1925--report given to MELITON Tariq RN.

## 2019-09-07 VITALS
TEMPERATURE: 98.2 F | DIASTOLIC BLOOD PRESSURE: 55 MMHG | SYSTOLIC BLOOD PRESSURE: 100 MMHG | OXYGEN SATURATION: 99 % | RESPIRATION RATE: 18 BRPM | HEART RATE: 80 BPM

## 2019-09-07 PROCEDURE — 74011250637 HC RX REV CODE- 250/637: Performed by: OBSTETRICS & GYNECOLOGY

## 2019-09-07 RX ORDER — IBUPROFEN 800 MG/1
800 TABLET ORAL
Qty: 60 TAB | Refills: 1 | Status: SHIPPED | OUTPATIENT
Start: 2019-09-07 | End: 2020-12-29

## 2019-09-07 RX ORDER — OXYCODONE AND ACETAMINOPHEN 5; 325 MG/1; MG/1
1 TABLET ORAL
Qty: 15 TAB | Refills: 0 | Status: SHIPPED | OUTPATIENT
Start: 2019-09-07 | End: 2019-09-10

## 2019-09-07 RX ADMIN — OXYCODONE HYDROCHLORIDE AND ACETAMINOPHEN 1 TABLET: 5; 325 TABLET ORAL at 06:04

## 2019-09-07 RX ADMIN — IBUPROFEN 800 MG: 400 TABLET, FILM COATED ORAL at 09:58

## 2019-09-07 RX ADMIN — OXYCODONE HYDROCHLORIDE AND ACETAMINOPHEN 1 TABLET: 5; 325 TABLET ORAL at 14:33

## 2019-09-07 RX ADMIN — IBUPROFEN 800 MG: 400 TABLET, FILM COATED ORAL at 01:01

## 2019-09-07 NOTE — DISCHARGE INSTRUCTIONS

## 2019-09-07 NOTE — PROGRESS NOTES
Bedside and Verbal shift change report given to Eloy Cao RN (oncoming nurse) by Carolann Spain RN (offgoing nurse). Report included the following information SBAR, Kardex, Procedure Summary, Intake/Output, MAR and Recent Results.

## 2019-09-07 NOTE — DISCHARGE SUMMARY
Obstetrical Discharge Summary     Name: Oneyda Harrell MRN: [id number]  SSN: [id number]    YOB: 1985  Age: 30 y.o. Sex: female      Admit Date: 2019    Discharge Date: 2019     Admitting Physician: Madonna Whalen MD     Attending Physician:  Christian Solorzano MD     Admission Diagnoses: History of  delivery affecting pregnancy [O34.219]    Discharge Diagnoses:   Information for the patient's :  Oley Hashimoto [[id number]]   Delivery of a 2.8 kg male infant via , Low Transverse on 2019 at 6:29 PM  by Madonna Whalen. Apgars were   and  . Patient Active Problem List   Diagnosis Code    Postpartum care following  delivery Z39.2    Breast mass, right N63.10    History of  delivery affecting pregnancy O34.219       Immunization(s):   Immunization History   Administered Date(s) Administered    Tdap 05/15/2017        Labs: No results found for this or any previous visit (from the past 24 hour(s)). Exam:  Breathing without exertion. Breasts are soft and non-tender  Fundus firm and nontender  Incision healing well, no induration or erythema  Lochia light  Bowel sounds are normal  Legs are normal without tenderness, swelling, or redness    Hospital Course: Normal hospital course following the delivery. Patient Instructions:   Current Discharge Medication List      CONTINUE these medications which have CHANGED    Details   oxyCODONE-acetaminophen (PERCOCET) 5-325 mg per tablet Take 1 Tab by mouth every four (4) hours as needed for Pain for up to 3 days. Max Daily Amount: 6 Tabs. Qty: 15 Tab, Refills: 0    Associated Diagnoses: Postpartum care following  delivery      ibuprofen (MOTRIN) 800 mg tablet Take 1 Tab by mouth every eight (8) hours as needed for Pain.   Qty: 60 Tab, Refills: 1         CONTINUE these medications which have NOT CHANGED    Details   magnesium oxide 200 mg magnesium chew magnesium 250 mg qd      docusate sodium (COLACE) 100 mg capsule Take 1 Cap by mouth two (2) times a day. Qty: 30 Cap, Refills: 0      PRENATAL VIT/IRON FUM/FOLIC AC (PRENATAL 1+1 PO) Take  by mouth. Assessment/Plan: Patient condition is stable for discharge home. She has been having an uncomplicated postpartum course s/p . Pain is well controlled on current medications. Breastfeeding well. Infant stable. Discharge home today. Precautions given. RTO for postpartum follow up in 6 weeks.          Signed By:  Gris Vann CNM     2019

## 2019-09-07 NOTE — PROGRESS NOTES
Discharge Instructions:     Signs of Illness:  CALL YOUR PROVIDER IF ANY OF THE FOLLOWING OCCUR  1. Temperature of 100.4 or above  2. Chills  3. Severe abdominal pain  4. Excessive vaginal bleeding (more than 1 pad/hour)  5. Large amount of clots  6. Unusual discharge or drainage  7. Discharge with foul odor  8. Pain or burning on urination  9. Painful, reddened, tender breasts  10. Other symptoms you do not understand     Activity  1. Rest when your baby rests  2. 1-2 weeks after delivery, gradually increase your activity  3. Don't lift anything heavier than your baby  4. Limit stair climbing  5. No driving for two weeks  6. Observe your incision for increased redness, swelling, pain/tenderess, or oozing/drainage. General Concerns  1. Eat healthy, proper nutrition and adequate fluids are essential for healing, strength and energy. 2. Continue monthly self breast exams  3. No douching, tampons or intercourse for 6 weeks  4. No tub baths, pools, or hot tubs for 6 weeks  5. IF YOU ARE BREASTFEEDING: In the first week, when you experience extreme fullness engorgement) in your breasts, it may be difficult for your baby to latch on. Refer to A Time to Care booklet. Call your pediatrician if this lasts more than 2 days. Follow-up  Call you provider on the day of discharge to schedule a follow-up appointment in 6 weeks. Call [facility] if you have any questions, and ask to speak with a nurse. DISCHARGE SUMMARY from Nurse    The following personal items collected during your admission are returned to you:   Dental Appliance: Dental Appliances: None  Vision: Visual Aid: None  Hearing Aid:    Jewelry: Jewelry: None  Clothing: Clothing: Footwear;Jacket/Coat;Shirt;Pants  Other Valuables:  Other Valuables: None  Valuables sent to safe: Personal Items Sent to Safe: none    Current Discharge Medication List      CONTINUE these medications which have NOT CHANGED    Details   PRENATAL VITS W-CA,FE,FA,<1MG, (PRENATAL #2 PO) Take 1 Tab by mouth daily. *  Please give a list of your current medications to your Primary Care Provider. *  Please update this list whenever your medications are discontinued, doses are      changed, or new medications (including over-the-counter products) are added. *  Please carry medication information at all times in case of emergency situations. These are general instructions for a healthy lifestyle:    No smoking/ No tobacco products/ Avoid exposure to second hand smoke    Surgeon General's Warning:  Quitting smoking now greatly reduces serious risk to your health. Obesity, smoking, and sedentary lifestyle greatly increases your risk for illness    A healthy diet, regular physical exercise & weight monitoring are important for maintaining a healthy lifestyle    You may be retaining fluid if you have a history of heart failure or if you experience any of the following symptoms:  Weight gain of 3 pounds or more overnight or 5 pounds in a week, increased swelling in our hands or feet or shortness of breath while lying flat in bed. Please call your doctor as soon as you notice any of these symptoms; do not wait until your next office visit. Recognize signs and symptoms of STROKE:    F-face looks uneven    A-arms unable to move or move unevenly    S-speech slurred or non-existent    T-time-call 911 as soon as signs and symptoms begin-DO NOT go       Back to bed or wait to see if you get better-TIME IS BRAIN. The discharge information has been reviewed with the patient and spouse. The patient and spouse verbalized understanding. Patient armband removed and given to patient to take home. Patient was informed of the privacy risks if armband lost or stolen    [employer] Activation    Thank you for requesting access to [employer]. Please follow the instructions below to securely access and download your online medical record.  [employer] allows you to send messages to your doctor, view your test results, renew your prescriptions, schedule appointments, and more. How Do I Sign Up? 1. In your internet browser, go to [email or web address]  2. Click on the First Time User? Click Here link in the Sign In box. You will be redirect to the New Member Sign Up page. 3. Enter your [employer] Access Code exactly as it appears below. You will not need to use this code after youve completed the sign-up process. If you do not sign up before the expiration date, you must request a new code. [employer] Access Code: [unfilled] (This is the date your [employer] access code will )    4. Enter the last four digits of your Social Security Number (xxxx) and Date of Birth (mm/dd/yyyy) as indicated and click Submit. You will be taken to the next sign-up page. 5. Create a [employer] ID. This will be your [employer] login ID and cannot be changed, so think of one that is secure and easy to remember. 6. Create a [employer] password. You can change your password at any time. 7. Enter your Password Reset Question and Answer. This can be used at a later time if you forget your password. 8. Enter your e-mail address. You will receive e-mail notification when new information is available in [address] 9. Click Sign Up. You can now view and download portions of your medical record. 10. Click the Download Summary menu link to download a portable copy of your medical information. Additional Information    If you have questions, please visit the Frequently Asked Questions section of the [employer] website at [email or web address] Remember, [employer] is NOT to be used for urgent needs. For medical emergencies, dial 911.

## 2020-12-28 ENCOUNTER — OFFICE VISIT (OUTPATIENT)
Dept: SURGERY | Age: 35
End: 2020-12-28
Payer: COMMERCIAL

## 2020-12-28 VITALS
HEART RATE: 86 BPM | SYSTOLIC BLOOD PRESSURE: 109 MMHG | DIASTOLIC BLOOD PRESSURE: 69 MMHG | HEIGHT: 60 IN | RESPIRATION RATE: 20 BRPM | BODY MASS INDEX: 25.13 KG/M2 | WEIGHT: 128 LBS | TEMPERATURE: 97 F

## 2020-12-28 DIAGNOSIS — N63.10 BREAST MASS, RIGHT: Primary | ICD-10-CM

## 2020-12-28 PROCEDURE — 99214 OFFICE O/P EST MOD 30 MIN: CPT | Performed by: SURGERY

## 2020-12-28 NOTE — PATIENT INSTRUCTIONS
.. PATIENT PRE AND POST OPERATIVE INSTRUCTIONS [address] [place] [address] [id number] [place] [address] 
[phone] Before Surgery Instructions:  
1) You must have someone available to drive you to and from your procedure and stay with you for the first 24 hours. 2) It is very important that you have nothing to eat or drink after midnight the night before your surgery. This includes chewing gum or sucking on hard candy. Take only heart, blood pressure and cholesterol medications the morning of surgery with only a sip of water. 3) Please stop taking Plavix 5-7 days prior to your surgery. Stop taking Coumadin 5 days prior to your surgery. Stop taking all Aspirin or Aspirin containing products 7 days prior to your surgery. Stop taking Advil, Motrin, Aleve, and etc. 3 days prior to your surgery. 4) If you take any diabetic medications please consult with your primary care physician on how to take them on the day of your surgery 5) Please stop all Herbal products 2 weeks prior to your surgery. 6) Please arrive at the hospital 2 hours prior to your surgery, unless you have been otherwise instructed. Any required labs/urine drug screen/x-rays will be performed on day of surgery. 7) If you are of child bearing age you will have pregnancy test done the morning of your surgery as soon as you arrive. 8) Patients having an operation on their colon will be given a separate instruction sheet on their Bowel Prep. 9) For any pre-operative work up check in at the main entrance to [address] [place] [address], and then go to Patient Registration. These studies are done on a walk in basis they are open from 7:00am to 5:00pm Monday through Friday. 10) Please wash your surgical site the morning of your surgery with antibacterial (i.e. Dial) soap and water.  
11) You will be contacted by a hospital preadmission nurse approximately one week prior to scheduled surgery to discuss additional instructions and to review your medications. 12) You may be contacted to change your surgery time. At times this is necessary due to equipment, staffing needs or in the event of a cancellation. 13) Please have COVID test done prior to scheduled surgery. Tests are performed 7:00am to 11:00am at [facility] at [address] [facility]. Check in at patient registration in the main lobby. Please be advised it's your responsibility to notify our office of any changes to your healthcare coverage. Failure to notify our office of any changes to your health care coverage may result in denial of payment by your health insurance for all incurred services and you would be responsible for payment for all incurred services. Surgery Date and Time: Tuesday, January 5, 2021 at 10:30am 
 
Please check in at [facility], enter through the main entrance and go to patient registration in the main lobby. Please check in by 8:30am the day of your surgery. After Surgery Instructions: You will need to be seen in the office for a follow-up visit 7-14 days after your surgery. Please call after you have had the procedure to make this appointment. Unless otherwise instructed, you may remove your outer bandage and shower 48 hours after your surgery. If you develop a fever greater than 101, have any significant drainage, bleeding, swelling and/or pus of the wound. Please call our office immediately. You may contact Erlinda Coreas with any questions at [phone].  [email or web address]

## 2020-12-28 NOTE — LETTER
12/28/2020 1:11 PM 
 
Patient:  Monik Del Rio YOB: 1985 Date of Visit: 12/28/2020 Reagan Smith MD 
[address] [address] [address] Fax: [phone] Dear Reagan Smith MD, 
 
 
I had the pleasure of seeing Ms. Farhat Salguero in my office today for her breast mass. I am including a copy of my office visit today. If you have questions, please do not hesitate to call me. I look forward to following Ms. Maryan Kramer along with you and will keep you updated as to her progress. Sincerely, Frantz Dodson MD

## 2020-12-28 NOTE — PROGRESS NOTES
Breast Mass       Ms. Mayda Velasquez is a 37year old woman with a right breast mass. I initially saw her when she was 38 weeks pregnant. It is located in the upper outer quadrant. She initially noticed the mass around 2019. It has continued to increased in size. It is bothersome. She denied history of similar symptoms previously. She reports induced nipple discharge, which she also experienced during a previous pregnancy. We discussed excision, but elected to wait until she was no longer pregnant. Recent imaging was read as BIRADS 4. Breast/GYN history:    Past Medical History:   Diagnosis Date    Breech presentation 2017    Infertility, female     Polycystic disease, ovaries        Past Surgical History:   Procedure Laterality Date    HX  SECTION      HX WISDOM TEETH EXTRACTION         Current Outpatient Medications on File Prior to Visit   Medication Sig Dispense Refill    PRENATAL VIT/IRON FUM/FOLIC AC (PRENATAL 1+1 PO) Take  by mouth.  ibuprofen (MOTRIN) 800 mg tablet Take 1 Tab by mouth every eight (8) hours as needed for Pain. 60 Tab 1    magnesium oxide 200 mg magnesium chew magnesium   250 mg qd      docusate sodium (COLACE) 100 mg capsule Take 1 Cap by mouth two (2) times a day. 30 Cap 0     No current facility-administered medications on file prior to visit. Allergies   Allergen Reactions    Ceclor [Cefaclor] Rash       Social History     Tobacco Use    Smoking status: Never Smoker    Smokeless tobacco: Never Used   Substance Use Topics    Alcohol use: No    Drug use: No       History reviewed. No pertinent family history.       ROS:   Positives are bolded  General: fevers, chills, night sweats, fatigue, weight loss, weight gain  GI: nausea, vomiting, abdominal pain, change in bowel habits, hematochezia, melena, GERD  Integ: dermatitis, abnormal moles  HEENT: visual changes, vertigo, epistaxis, dysphagia, hoarseness  Cardiac: chest pain, palpitations, HTN, edema, varicosities  Resp: cough, shortness of breath, wheezing, hemoptysis, snoring, reactive airway disease  : hematuria, dysuria, frequency, urgency, nocturia, stress urinary incontinence   MSK: weakness, joint pain, arthritis  Endocrine: diabetes, thyroid disease, polyuria, polydipsia, polyphagia, poor wound healing, heat intolerance, cold intolerance  Lymph/Heme: anemia, bruising, history of blood transfusions  Neuro: dizziness, headache, fainting, seizures, stroke  Psych: anxiety, depression    Physical Exam:  Visit Vitals  /69 (BP 1 Location: Left arm, BP Patient Position: At rest)   Pulse 86   Temp 97 °F (36.1 °C) (Oral)   Resp 20   Ht 5' (1.524 m)   Wt 58.1 kg (128 lb)   BMI 25.00 kg/m²       Gen: No distress  Head: Normocephalic, atraumatic  Mouth: Clear, no overt lesions, oral mucosa pink and moist  Neck: Supple, no masses, no adenopathy, trachea midline  Resp: Clear bilaterally  Cardio: Regular rate and rhythm  Abdomen: soft, nontender, nondistended  Extremeties: warm, well-perfused  Neuro: sensation and strength grossly intact and symmetrical  Psych: alert and oriented to person, place and time  Breasts:  Right: Examined in both the supine and upright positions. There was no supraclavicular, infraclavicular, or axillary lympadenopathy. There were no dominant masses, no skin changes, no asymmetry identified smooth sub centimeter nodule upper outer  Left:  Examined in both the supine and upright positions. There was no supraclavicular, infraclavicular, or axillary lympadenopathy.    There were no dominant masses, no skin changes, no asymmetry identified       Imaging:      3/29/19 right breast ultrasound ([facility])  The palpable finding in the right breast 9 to 930 position likely represents a tiny sebaceous cyst  BIRADS 2    1/23/19 right breast ultrasound ([facility])  Normal breast tissue is not seen in the area of the palpable lump in the right breast.  No intraductal mass  BIRADS 1      Impression:  Patient Active Problem List   Diagnosis Code    Postpartum care following  delivery Z39.2    Breast mass, right N63.10    History of  delivery affecting pregnancy O29.36        34 year old woman with right breast mass, increasing in size and symptomatic. We discussed advantages and disadvantages of core biopsy and excisional biopsy. Image guided core biopsy explained . Risks, benefits and options discussed. We discussed that after the biopsy bruising is quite common and it is normal to feel a \"lump\". Both generally resolve within a few weeks. We discussed the possibility of inaccurate result, though that is distinctly uncommon. We discussed that excision would not be a \"cancer operation\". Additional surgery may be recommended for margins and/or lymph nodes. Risks, benefits and options were discussed in detail to include, but not limited to, bleeding, infection, risks of anesthesia, injury to surrounding structures and other unforeseen events such as stroke, heart attack or death. Ms. Pari Soni understanding and wishes to proceed with right breast excisional biopsy as it has increased in size and is sympotmatic. She was asked to call with any questions or concerns.

## 2020-12-28 NOTE — H&P (VIEW-ONLY)
Breast Mass Ms. Hammad Neil is a 37year old woman with a right breast mass. I initially saw her when she was 38 weeks pregnant. It is located in the upper outer quadrant. She initially noticed the mass around 2019. It has continued to increased in size. It is bothersome. She denied history of similar symptoms previously. She reports induced nipple discharge, which she also experienced during a previous pregnancy. We discussed excision, but elected to wait until she was no longer pregnant. Recent imaging was read as BIRADS 4. Breast/GYN history:   
Past Medical History:  
Diagnosis Date  Breech presentation 2017  Infertility, female  Polycystic disease, ovaries Past Surgical History:  
Procedure Laterality Date  HX  SECTION    
 HX WISDOM TEETH EXTRACTION   Current Outpatient Medications on File Prior to Visit Medication Sig Dispense Refill  PRENATAL VIT/IRON FUM/FOLIC AC (PRENATAL 1+1 PO) Take  by mouth.  ibuprofen (MOTRIN) 800 mg tablet Take 1 Tab by mouth every eight (8) hours as needed for Pain. 60 Tab 1  
 magnesium oxide 200 mg magnesium chew magnesium 250 mg qd    
 docusate sodium (COLACE) 100 mg capsule Take 1 Cap by mouth two (2) times a day. 30 Cap 0 No current facility-administered medications on file prior to visit. Allergies Allergen Reactions  Ceclor [Cefaclor] Rash Social History Tobacco Use  Smoking status: Never Smoker  Smokeless tobacco: Never Used Substance Use Topics  Alcohol use: No  
 Drug use: No  
 
 
History reviewed. No pertinent family history. ROS:  
Positives are bolded General: fevers, chills, night sweats, fatigue, weight loss, weight gain GI: nausea, vomiting, abdominal pain, change in bowel habits, hematochezia, melena, GERD Integ: dermatitis, abnormal moles HEENT: visual changes, vertigo, epistaxis, dysphagia, hoarseness Cardiac: chest pain, palpitations, HTN, edema, varicosities Resp: cough, shortness of breath, wheezing, hemoptysis, snoring, reactive airway disease : hematuria, dysuria, frequency, urgency, nocturia, stress urinary incontinence MSK: weakness, joint pain, arthritis Endocrine: diabetes, thyroid disease, polyuria, polydipsia, polyphagia, poor wound healing, heat intolerance, cold intolerance Lymph/Heme: anemia, bruising, history of blood transfusions Neuro: dizziness, headache, fainting, seizures, stroke Psych: anxiety, depression Physical Exam: 
Visit Vitals /69 (BP 1 Location: Left arm, BP Patient Position: At rest) Pulse 86 Temp 97 °F (36.1 °C) (Oral) Resp 20 Ht 5' (1.524 m) Wt 58.1 kg (128 lb) BMI 25.00 kg/m² Gen: No distress Head: Normocephalic, atraumatic Mouth: Clear, no overt lesions, oral mucosa pink and moist 
Neck: Supple, no masses, no adenopathy, trachea midline Resp: Clear bilaterally Cardio: Regular rate and rhythm Abdomen: soft, nontender, nondistended Extremeties: warm, well-perfused Neuro: sensation and strength grossly intact and symmetrical 
Psych: alert and oriented to person, place and time Breasts: 
Right: Examined in both the supine and upright positions. There was no supraclavicular, infraclavicular, or axillary lympadenopathy. There were no dominant masses, no skin changes, no asymmetry identified smooth sub centimeter nodule upper outer Left:  Examined in both the supine and upright positions. There was no supraclavicular, infraclavicular, or axillary lympadenopathy. There were no dominant masses, no skin changes, no asymmetry identified Imaging: 
 
 
3/29/19 right breast ultrasound ([facility]) The palpable finding in the right breast 9 to 930 position likely represents a tiny sebaceous cyst 
BIRADS 2 
 
1/23/19 right breast ultrasound ([facility]) Normal breast tissue is not seen in the area of the palpable lump in the right breast.  No intraductal mass BIRADS 1 Impression: 
Patient Active Problem List  
Diagnosis Code  Postpartum care following  delivery Z39.2  Breast mass, right N63.10  History of  delivery affecting pregnancy O31.36  
  
 
37 year old woman with right breast mass, increasing in size and symptomatic. We discussed advantages and disadvantages of core biopsy and excisional biopsy. Image guided core biopsy explained . Risks, benefits and options discussed. We discussed that after the biopsy bruising is quite common and it is normal to feel a \"lump\". Both generally resolve within a few weeks. We discussed the possibility of inaccurate result, though that is distinctly uncommon. We discussed that excision would not be a \"cancer operation\". Additional surgery may be recommended for margins and/or lymph nodes. Risks, benefits and options were discussed in detail to include, but not limited to, bleeding, infection, risks of anesthesia, injury to surrounding structures and other unforeseen events such as stroke, heart attack or death. Ms. Mishel Underwood understanding and wishes to proceed with right breast excisional biopsy as it has increased in size and is sympotmatic. She was asked to call with any questions or concerns.

## 2020-12-29 NOTE — PERIOP NOTES
PAT - SURGICAL PRE-ADMISSION INSTRUCTIONS    NAME:  Dodson Cogan                                                          TODAY'S DATE:  12/29/2020    SURGERY DATE:  1/5/2021                                  SURGERY ARRIVAL TIME:   0915 TBV    1. Do NOT eat or drink anything, including candy or gum, after MIDNIGHT on 1/4/2021 , unless you have specific instructions from your Surgeon or Anesthesia Provider to do so. 2. No smoking on the day of surgery. 3. No alcohol 24 hours prior to the day of surgery. 4. No recreational drugs for one week prior to the day of surgery. 5. Leave all valuables, including money/purse, at home. 6. Remove all jewelry, nail polish, makeup (including mascara); no lotions, powders, deodorant, or perfume/cologne/after shave. 7. Glasses/Contact lenses and Dentures may be worn to the hospital.  They will be removed prior to surgery. 8. Call your doctor if symptoms of a cold or illness develop within 24 ours prior to surgery. 9. AN ADULT MUST DRIVE YOU HOME AFTER OUTPATIENT SURGERY. 10. If you are having an OUTPATIENT procedure, please make arrangements for a responsible adult to be with you for 24 hours after your surgery. 11. If you are admitted to the hospital, you will be assigned to a bed after surgery is complete. Normally a family member will not be able to see you until you are in your assigned bed. 15. Family is encouraged to accompany you to the hospital.  We ask visitors in the treatment area to be limited to ONE person at a time to ensure patient privacy. EXCEPTIONS WILL BE MADE AS NEEDED. 15. Children under 12 are discouraged from entering the treatment area and need to be supervised by an adult when in the waiting room. Special Instructions:    NONE. Patient Prep:    shower with anti-bacterial soap    These surgical instructions were reviewed with PATIENT during the PAT PHONE CALL. Directions:   On the morning of surgery, please go to the MAIN ENTRANCE. Sign in at the Registration Desk.     If you have any questions and/or concerns, please do not hesitate to call:  (Prior to the day of surgery)  [facility] unit:  [phone]  (Day of surgery)  [facility] unit:  [phone]

## 2020-12-29 NOTE — PERIOP NOTES
Called office and spoke to [place] re: new antibiotic order; Ancef was ordered, but patient allergic to Ceclor; received telephone order for clindamycin.

## 2020-12-31 ENCOUNTER — HOSPITAL ENCOUNTER (OUTPATIENT)
Dept: PREADMISSION TESTING | Age: 35
Discharge: HOME | End: 2020-12-31
Payer: COMMERCIAL

## 2020-12-31 ENCOUNTER — TRANSCRIBE ORDER (OUTPATIENT)
Dept: REGISTRATION | Age: 35
End: 2020-12-31

## 2020-12-31 DIAGNOSIS — Z01.812 BLOOD TESTS PRIOR TO TREATMENT OR PROCEDURE: ICD-10-CM

## 2020-12-31 DIAGNOSIS — Z01.812 BLOOD TESTS PRIOR TO TREATMENT OR PROCEDURE: Primary | ICD-10-CM

## 2020-12-31 DIAGNOSIS — Z20.828 EXPOSURE TO SARS-ASSOCIATED CORONAVIRUS: ICD-10-CM

## 2020-12-31 PROCEDURE — 87635 SARS-COV-2 COVID-19 AMP PRB: CPT

## 2021-01-01 LAB — SARS-COV-2, COV2NT: NOT DETECTED

## 2021-01-04 ENCOUNTER — ANESTHESIA EVENT (OUTPATIENT)
Dept: SURGERY | Age: 36
End: 2021-01-04
Payer: COMMERCIAL

## 2021-01-05 ENCOUNTER — HOSPITAL ENCOUNTER (OUTPATIENT)
Age: 36
Setting detail: OUTPATIENT SURGERY
Discharge: HOME | End: 2021-01-05
Attending: SURGERY | Admitting: SURGERY
Payer: COMMERCIAL

## 2021-01-05 ENCOUNTER — TELEPHONE (OUTPATIENT)
Dept: SURGERY | Age: 36
End: 2021-01-05

## 2021-01-05 ENCOUNTER — ANESTHESIA (OUTPATIENT)
Dept: SURGERY | Age: 36
End: 2021-01-05
Payer: COMMERCIAL

## 2021-01-05 VITALS
OXYGEN SATURATION: 100 % | DIASTOLIC BLOOD PRESSURE: 58 MMHG | HEIGHT: 63 IN | TEMPERATURE: 97.3 F | SYSTOLIC BLOOD PRESSURE: 91 MMHG | WEIGHT: 128 LBS | RESPIRATION RATE: 16 BRPM | BODY MASS INDEX: 22.68 KG/M2 | HEART RATE: 86 BPM

## 2021-01-05 DIAGNOSIS — N63.10 BREAST MASS, RIGHT: ICD-10-CM

## 2021-01-05 LAB — HCG UR QL: NEGATIVE

## 2021-01-05 PROCEDURE — 74011250636 HC RX REV CODE- 250/636: Performed by: NURSE ANESTHETIST, CERTIFIED REGISTERED

## 2021-01-05 PROCEDURE — 74011000250 HC RX REV CODE- 250: Performed by: NURSE ANESTHETIST, CERTIFIED REGISTERED

## 2021-01-05 PROCEDURE — 77030002933 HC SUT MCRYL J&J -A: Performed by: SURGERY

## 2021-01-05 PROCEDURE — 88307 TISSUE EXAM BY PATHOLOGIST: CPT

## 2021-01-05 PROCEDURE — 88342 IMHCHEM/IMCYTCHM 1ST ANTB: CPT

## 2021-01-05 PROCEDURE — 76210000063 HC OR PH I REC FIRST 0.5 HR: Performed by: SURGERY

## 2021-01-05 PROCEDURE — 81025 URINE PREGNANCY TEST: CPT

## 2021-01-05 PROCEDURE — 77030002996 HC SUT SLK J&J -A: Performed by: SURGERY

## 2021-01-05 PROCEDURE — 74011000272 HC RX REV CODE- 272: Performed by: SURGERY

## 2021-01-05 PROCEDURE — 77030031139 HC SUT VCRL2 J&J -A: Performed by: SURGERY

## 2021-01-05 PROCEDURE — 19301 PARTIAL MASTECTOMY: CPT | Performed by: SURGERY

## 2021-01-05 PROCEDURE — 77030037400 HC ADH TISS HI VISC EXOFIN CHMP -B: Performed by: SURGERY

## 2021-01-05 PROCEDURE — 76210000020 HC REC RM PH II FIRST 0.5 HR: Performed by: SURGERY

## 2021-01-05 PROCEDURE — 00400 ANES INTEGUMENTARY SYS NOS: CPT | Performed by: ANESTHESIOLOGY

## 2021-01-05 PROCEDURE — 74011250637 HC RX REV CODE- 250/637: Performed by: NURSE ANESTHETIST, CERTIFIED REGISTERED

## 2021-01-05 PROCEDURE — 74011250636 HC RX REV CODE- 250/636: Performed by: SURGERY

## 2021-01-05 PROCEDURE — 77030012510 HC MSK AIRWY LMA TELE -B: Performed by: ANESTHESIOLOGY

## 2021-01-05 PROCEDURE — 76010000138 HC OR TIME 0.5 TO 1 HR: Performed by: SURGERY

## 2021-01-05 PROCEDURE — 2709999900 HC NON-CHARGEABLE SUPPLY: Performed by: SURGERY

## 2021-01-05 PROCEDURE — 74011000250 HC RX REV CODE- 250: Performed by: SURGERY

## 2021-01-05 PROCEDURE — 76060000032 HC ANESTHESIA 0.5 TO 1 HR: Performed by: SURGERY

## 2021-01-05 PROCEDURE — 88341 IMHCHEM/IMCYTCHM EA ADD ANTB: CPT

## 2021-01-05 RX ORDER — SODIUM CHLORIDE 0.9 % (FLUSH) 0.9 %
5-40 SYRINGE (ML) INJECTION EVERY 8 HOURS
Status: DISCONTINUED | OUTPATIENT
Start: 2021-01-05 | End: 2021-01-05 | Stop reason: HOSPADM

## 2021-01-05 RX ORDER — FAMOTIDINE 20 MG/1
20 TABLET, FILM COATED ORAL ONCE
Status: COMPLETED | OUTPATIENT
Start: 2021-01-05 | End: 2021-01-05

## 2021-01-05 RX ORDER — OXYCODONE AND ACETAMINOPHEN 5; 325 MG/1; MG/1
1 TABLET ORAL
Status: DISCONTINUED | OUTPATIENT
Start: 2021-01-05 | End: 2021-01-05 | Stop reason: HOSPADM

## 2021-01-05 RX ORDER — DEXAMETHASONE SODIUM PHOSPHATE 4 MG/ML
INJECTION, SOLUTION INTRA-ARTICULAR; INTRALESIONAL; INTRAMUSCULAR; INTRAVENOUS; SOFT TISSUE AS NEEDED
Status: DISCONTINUED | OUTPATIENT
Start: 2021-01-05 | End: 2021-01-05 | Stop reason: HOSPADM

## 2021-01-05 RX ORDER — DEXTROSE MONOHYDRATE 25 G/50ML
25-50 INJECTION, SOLUTION INTRAVENOUS AS NEEDED
Status: DISCONTINUED | OUTPATIENT
Start: 2021-01-05 | End: 2021-01-05 | Stop reason: HOSPADM

## 2021-01-05 RX ORDER — MIDAZOLAM HYDROCHLORIDE 1 MG/ML
INJECTION, SOLUTION INTRAMUSCULAR; INTRAVENOUS AS NEEDED
Status: DISCONTINUED | OUTPATIENT
Start: 2021-01-05 | End: 2021-01-05 | Stop reason: HOSPADM

## 2021-01-05 RX ORDER — ONDANSETRON 2 MG/ML
INJECTION INTRAMUSCULAR; INTRAVENOUS AS NEEDED
Status: DISCONTINUED | OUTPATIENT
Start: 2021-01-05 | End: 2021-01-05 | Stop reason: HOSPADM

## 2021-01-05 RX ORDER — SODIUM CHLORIDE 0.9 % (FLUSH) 0.9 %
5-40 SYRINGE (ML) INJECTION AS NEEDED
Status: DISCONTINUED | OUTPATIENT
Start: 2021-01-05 | End: 2021-01-05 | Stop reason: HOSPADM

## 2021-01-05 RX ORDER — MAGNESIUM SULFATE 100 %
4 CRYSTALS MISCELLANEOUS AS NEEDED
Status: DISCONTINUED | OUTPATIENT
Start: 2021-01-05 | End: 2021-01-05 | Stop reason: SDUPTHER

## 2021-01-05 RX ORDER — PROPOFOL 10 MG/ML
INJECTION, EMULSION INTRAVENOUS AS NEEDED
Status: DISCONTINUED | OUTPATIENT
Start: 2021-01-05 | End: 2021-01-05 | Stop reason: HOSPADM

## 2021-01-05 RX ORDER — FENTANYL CITRATE 50 UG/ML
INJECTION, SOLUTION INTRAMUSCULAR; INTRAVENOUS AS NEEDED
Status: DISCONTINUED | OUTPATIENT
Start: 2021-01-05 | End: 2021-01-05 | Stop reason: HOSPADM

## 2021-01-05 RX ORDER — SODIUM CHLORIDE, SODIUM LACTATE, POTASSIUM CHLORIDE, CALCIUM CHLORIDE 600; 310; 30; 20 MG/100ML; MG/100ML; MG/100ML; MG/100ML
25 INJECTION, SOLUTION INTRAVENOUS CONTINUOUS
Status: DISCONTINUED | OUTPATIENT
Start: 2021-01-05 | End: 2021-01-05 | Stop reason: HOSPADM

## 2021-01-05 RX ORDER — MAGNESIUM SULFATE 100 %
4 CRYSTALS MISCELLANEOUS AS NEEDED
Status: DISCONTINUED | OUTPATIENT
Start: 2021-01-05 | End: 2021-01-05 | Stop reason: HOSPADM

## 2021-01-05 RX ORDER — DEXTROSE MONOHYDRATE 25 G/50ML
25-50 INJECTION, SOLUTION INTRAVENOUS AS NEEDED
Status: DISCONTINUED | OUTPATIENT
Start: 2021-01-05 | End: 2021-01-05 | Stop reason: SDUPTHER

## 2021-01-05 RX ORDER — BUPIVACAINE HYDROCHLORIDE AND EPINEPHRINE 5; 5 MG/ML; UG/ML
INJECTION, SOLUTION EPIDURAL; INTRACAUDAL; PERINEURAL AS NEEDED
Status: DISCONTINUED | OUTPATIENT
Start: 2021-01-05 | End: 2021-01-05 | Stop reason: HOSPADM

## 2021-01-05 RX ORDER — SODIUM CHLORIDE 0.9 % (FLUSH) 0.9 %
5-40 SYRINGE (ML) INJECTION AS NEEDED
Status: DISCONTINUED | OUTPATIENT
Start: 2021-01-05 | End: 2021-01-05 | Stop reason: SDUPTHER

## 2021-01-05 RX ORDER — SODIUM CHLORIDE 0.9 % (FLUSH) 0.9 %
5-40 SYRINGE (ML) INJECTION EVERY 8 HOURS
Status: DISCONTINUED | OUTPATIENT
Start: 2021-01-05 | End: 2021-01-05 | Stop reason: SDUPTHER

## 2021-01-05 RX ORDER — KETOROLAC TROMETHAMINE 15 MG/ML
INJECTION, SOLUTION INTRAMUSCULAR; INTRAVENOUS AS NEEDED
Status: DISCONTINUED | OUTPATIENT
Start: 2021-01-05 | End: 2021-01-05 | Stop reason: HOSPADM

## 2021-01-05 RX ORDER — SODIUM CHLORIDE, SODIUM LACTATE, POTASSIUM CHLORIDE, CALCIUM CHLORIDE 600; 310; 30; 20 MG/100ML; MG/100ML; MG/100ML; MG/100ML
25 INJECTION, SOLUTION INTRAVENOUS CONTINUOUS
Status: DISCONTINUED | OUTPATIENT
Start: 2021-01-05 | End: 2021-01-05 | Stop reason: SDUPTHER

## 2021-01-05 RX ORDER — LIDOCAINE HYDROCHLORIDE 20 MG/ML
INJECTION, SOLUTION EPIDURAL; INFILTRATION; INTRACAUDAL; PERINEURAL AS NEEDED
Status: DISCONTINUED | OUTPATIENT
Start: 2021-01-05 | End: 2021-01-05 | Stop reason: HOSPADM

## 2021-01-05 RX ORDER — FENTANYL CITRATE 50 UG/ML
50 INJECTION, SOLUTION INTRAMUSCULAR; INTRAVENOUS
Status: DISCONTINUED | OUTPATIENT
Start: 2021-01-05 | End: 2021-01-05 | Stop reason: HOSPADM

## 2021-01-05 RX ORDER — ONDANSETRON 2 MG/ML
4 INJECTION INTRAMUSCULAR; INTRAVENOUS
Status: DISCONTINUED | OUTPATIENT
Start: 2021-01-05 | End: 2021-01-05 | Stop reason: HOSPADM

## 2021-01-05 RX ORDER — CLINDAMYCIN PHOSPHATE 900 MG/50ML
900 INJECTION INTRAVENOUS
Status: COMPLETED | OUTPATIENT
Start: 2021-01-05 | End: 2021-01-05

## 2021-01-05 RX ORDER — LIDOCAINE HYDROCHLORIDE 10 MG/ML
0.1 INJECTION, SOLUTION EPIDURAL; INFILTRATION; INTRACAUDAL; PERINEURAL AS NEEDED
Status: DISCONTINUED | OUTPATIENT
Start: 2021-01-05 | End: 2021-01-05 | Stop reason: HOSPADM

## 2021-01-05 RX ORDER — INSULIN LISPRO 100 [IU]/ML
INJECTION, SOLUTION INTRAVENOUS; SUBCUTANEOUS ONCE
Status: DISCONTINUED | OUTPATIENT
Start: 2021-01-05 | End: 2021-01-05 | Stop reason: HOSPADM

## 2021-01-05 RX ADMIN — KETOROLAC TROMETHAMINE 30 MG: 15 INJECTION, SOLUTION INTRAMUSCULAR; INTRAVENOUS at 08:13

## 2021-01-05 RX ADMIN — DEXAMETHASONE SODIUM PHOSPHATE 4 MG: 4 INJECTION, SOLUTION INTRA-ARTICULAR; INTRALESIONAL; INTRAMUSCULAR; INTRAVENOUS; SOFT TISSUE at 07:53

## 2021-01-05 RX ADMIN — ONDANSETRON 4 MG: 2 SOLUTION INTRAMUSCULAR; INTRAVENOUS at 08:13

## 2021-01-05 RX ADMIN — PROPOFOL 150 MG: 10 INJECTION, EMULSION INTRAVENOUS at 07:43

## 2021-01-05 RX ADMIN — LIDOCAINE HYDROCHLORIDE 50 MG: 20 INJECTION, SOLUTION INTRAVENOUS at 07:43

## 2021-01-05 RX ADMIN — MIDAZOLAM 2 MG: 1 INJECTION INTRAMUSCULAR; INTRAVENOUS at 07:39

## 2021-01-05 RX ADMIN — CLINDAMYCIN PHOSPHATE 900 MG: 900 INJECTION INTRAVENOUS at 07:48

## 2021-01-05 RX ADMIN — FENTANYL CITRATE 25 MCG: 50 INJECTION, SOLUTION INTRAMUSCULAR; INTRAVENOUS at 08:34

## 2021-01-05 RX ADMIN — SODIUM CHLORIDE, SODIUM LACTATE, POTASSIUM CHLORIDE, AND CALCIUM CHLORIDE 25 ML/HR: 600; 310; 30; 20 INJECTION, SOLUTION INTRAVENOUS at 06:14

## 2021-01-05 RX ADMIN — FAMOTIDINE 20 MG: 20 TABLET ORAL at 06:28

## 2021-01-05 RX ADMIN — FENTANYL CITRATE 25 MCG: 50 INJECTION, SOLUTION INTRAMUSCULAR; INTRAVENOUS at 07:39

## 2021-01-05 RX ADMIN — FENTANYL CITRATE 25 MCG: 50 INJECTION, SOLUTION INTRAMUSCULAR; INTRAVENOUS at 07:58

## 2021-01-05 RX ADMIN — FENTANYL CITRATE 25 MCG: 50 INJECTION, SOLUTION INTRAMUSCULAR; INTRAVENOUS at 07:55

## 2021-01-05 NOTE — PERIOP NOTES
Pre-Op Summary    Pt arrived via car with family/friend and is oriented to time, place, person and situation. Patient with steady gait with none assistive devices. Visit Vitals  /62 (BP 1 Location: Left arm, BP Patient Position: At rest)   Pulse 71   Temp 98 °F (36.7 °C)   Resp 18   Ht 5' 3\" (1.6 m)   Wt 58.1 kg (128 lb)   LMP 12/15/2020   SpO2 100%   Breastfeeding No   BMI 22.67 kg/m²       Peripheral IV located on Left antecubital .    Patients belongings are located in storage. Patient's point of contact is Russell Tang and their contact number is: [phone]. They will be leaving and coming back. They are able to receive medication information. They will be their ride home.

## 2021-01-05 NOTE — PERIOP NOTES
Pt arrives from OR. Placed on monitors. VSS. Chart, MAR and anesthesia record reviewed. Will monitor status  0850 updated Linda Lynn  0884 report to [facility]. Opportunity for questions offered, clarification provided. VSS.   0904 pt transferred to P2 without incident.  Stable at time of transfer, relinquished care

## 2021-01-05 NOTE — OP NOTES
Date of Procedure: 1/5/2021  Preoperative Diagnosis: Right Breast Mass N63.10  Postoperative Diagnosis: Right Breast Mass N63.10  Procedure(s):  Procedure(s):  Right Breast Excisional Biopsy    Surgeon(s) and Role:      Abiodun Mar MD - Primary         Surgical Staff: Circ-1: Deisy Catalan RN  Scrub Tech-1: Tyesha Moon  Surg Asst-1: Marilin Giancarlo      Event Time In     Event Time In   Incision Start 0800   Incision Close      Event Time In   Patient In - Facility (Arrived) 9065   Patient In - Pre-op 1310   Anesthesia Start 8648   Patient Ready for OR 8083   Surgeon Available 9358   Patient Out - Pre-op 8835   Patient In - OR 0902   Surgeon In OR 0756   Incision Start 0800   Incision Close    Surgeon out of OR 3448   Patient Out - OR    Anesthesia Stop    Patient In - Phase I    Notice to Anesthesia    Care Complete - Phase I    Patient Out - Phase I    Patient In - Phase II    Patient Tolerates Liquids    Patient Ready for Visitors    Patient Education Complete    Patient Voided    Care Complete - Phase II    Patient Out - Phase II    End of Periop Care    Patient In - Overflow    Patient Out - Overflow        Anesthesia: General  Anesthesia staff: Anesthesiologist: Sang Ribeiro MD  CRNA: Elena Fuller CRNA  Estimated Blood Loss: 3cc  Specimens:   ID Type Source Tests Collected by Time Destination   1 : Right breast mass short superior lopng lateral tan anterior stitch Preservative Breast  Sierra Flores MD 1/5/2021 4176 Pathology        Findings: right breast mass   Complications: none noted  Implants: * No implants in log *      The patient was identified in the preoperative holding area and the risks again were reviewed with the patient who understands and agrees. She understands the risk of bleeding, infection, damage to surrounding structures, hematoma/seroma formation, and the possibility of missing the lesion in question.  She also understands additional surgery may be indicated. She was also marked by me to confirm the site and the procedure. The patient was taken to the operating suite and placed supine on the table. Compression stockings were placed and anesthesia induced without complication. She was then prepped and draped in the usual sterile fashion and an appropriate time-out was performed to confirm the patient, the side, and the procedure. Local anesthetic was infiltrated. An incision was made at the lateral circumareolar position in the right breast. We then dissected into the subcutaneous tissue of the breast towards the lesion of concern. We then used electrocautery to remove a core of breast tissue around the lesion with attention to margns. There was excellent hemostasis and the specimen was marked for orientation on removal of the tissue. This was then handed off the field for pathologic analysis. A clip was placed to mary lou the site. The breast tissue was mobilized to close the defect with 2-0 vicryl. All sponge and instrument counts were reported to me as correct. We continued the closure with a 3-0 vicryl suture, followed by 4-0 monocryl suture. Dermabond was then applied. The family was updated after the procedure. The patient was taken to recovery in good condition.

## 2021-01-05 NOTE — INTERVAL H&P NOTE
Update History & Physical    The Patient's History and Physical of   was reviewed with the patient and I examined the patient. There was no change. The surgical site was confirmed by the patient and me. Plan:  The risk, benefits, expected outcome, and alternative to the recommended procedure have been discussed with the patient. Patient understands and wants to proceed with the procedure.     Electronically signed by Yadira uRss MD on 1/5/2021 at 7:33 AM

## 2021-01-05 NOTE — PERIOP NOTES
.   Report received from [facility]:    01/05/21 0843 01/05/21 0848 01/05/21 0859 01/05/21 0905   BP: (!) 92/52 (!) 87/48  (!) 91/58   Pulse: 66 96 86 86   Resp: 10 17 19 16   Temp:  97.4 °F (36.3 °C)  97.3 °F (36.3 °C)   SpO2: 100% 97% 100% 100%   Weight:       Height:           Patient is oriented to time, place, person and situation    Patient OBB chair with minimal assistance, and tolerating fluids and activity. Vital signs stable. Denies pain. Lines and Drains  Peripheral Intravenous Line:   Peripheral IV 01/05/21 Anterior;Left;Proximal Forearm (Active)   Site Assessment Clean, dry, & intact 01/05/21 0905   Phlebitis Assessment 0 01/05/21 0905   Infiltration Assessment 0 01/05/21 0905   Dressing Status Clean, dry, & intact 01/05/21 0905   Dressing Type Transparent;Tape 01/05/21 0905   Hub Color/Line Status Blue; Infusing 01/05/21 0905   Alcohol Cap Used Yes 01/05/21 0905          Discharge instructions were given to patient and discussed with Mary Ventura and their contact number is: [phone] (via telephone. ..due to to emergency protocols in place). Medications were reviewed also. No questions or concerns at this time. Patient had time to ask any questions. Patient and caregiver verbalized understanding of discharge instructions. Patient discharged home with  family care.        Leny Thomas RN

## 2021-01-05 NOTE — ANESTHESIA PREPROCEDURE EVALUATION
Anesthetic History   No history of anesthetic complications            Review of Systems / Medical History  Patient summary reviewed and pertinent labs reviewed    Pulmonary  Within defined limits                 Neuro/Psych              Cardiovascular  Within defined limits                Exercise tolerance: >4 METS     GI/Hepatic/Renal  Within defined limits              Endo/Other  Within defined limits           Other Findings   Comments:   Risk Factors for Postoperative nausea/vomiting:       History of postoperative nausea/vomiting? NO       Female? YES       Motion sickness? NO       Intended opioid administration for postoperative analgesia? NO      Smoking Abstinence  Current Smoker? NO  Elective Surgery? NO  Seen preoperatively by anesthesiologist or proxy prior to day of surgery? YES  Pt abstained from smoking 24 hours prior to anesthesia?  YES           Physical Exam    Airway  Mallampati: I  TM Distance: 4 - 6 cm  Neck ROM: normal range of motion   Mouth opening: Normal     Cardiovascular    Rhythm: regular  Rate: normal         Dental  No notable dental hx       Pulmonary  Breath sounds clear to auscultation               Abdominal  GI exam deferred       Other Findings            Anesthetic Plan    ASA: 1  Anesthesia type: general          Induction: Intravenous  Anesthetic plan and risks discussed with: Patient

## 2021-01-05 NOTE — ANESTHESIA POSTPROCEDURE EVALUATION
Procedure(s):  Right Breast Excisional Biopsy. general    Anesthesia Post Evaluation      Multimodal analgesia: multimodal analgesia used between 6 hours prior to anesthesia start to PACU discharge  Patient location during evaluation: bedside  Patient participation: complete - patient participated  Level of consciousness: awake  Pain management: adequate  Airway patency: patent  Anesthetic complications: no  Cardiovascular status: stable  Respiratory status: acceptable  Hydration status: acceptable  Post anesthesia nausea and vomiting:  controlled      INITIAL Post-op Vital signs:   Vitals Value Taken Time   BP 87/48 01/05/21 0848   Temp 36.3 °C (97.4 °F) 01/05/21 0848   Pulse 84 01/05/21 0900   Resp 23 01/05/21 0900   SpO2 100 % 01/05/21 0900   Vitals shown include unvalidated device data.

## 2021-01-05 NOTE — DISCHARGE INSTRUCTIONS
DISCHARGE SUMMARY from Nurse    PATIENT INSTRUCTIONS:    After general anesthesia or intravenous sedation, for 24 hours or while taking prescription Narcotics:  · Limit your activities  · Do not drive and operate hazardous machinery  · Do not make important personal or business decisions  · Do  not drink alcoholic beverages  · If you have not urinated within 8 hours after discharge, please contact your surgeon on call. Report the following to your surgeon:  · Excessive pain, swelling, redness or odor of or around the surgical area  · Temperature over 100.5  · Nausea and vomiting lasting longer than 4 hours or if unable to take medications  · Any signs of decreased circulation or nerve impairment to extremity: change in color, persistent  numbness, tingling, coldness or increase pain  · Any questions    What to do at Home:  Recommended activity: Activity as tolerated and no driving for today,     These are general instructions for a healthy lifestyle:    No smoking/ No tobacco products/ Avoid exposure to second hand smoke  Surgeon General's Warning:  Quitting smoking now greatly reduces serious risk to your health. Obesity, smoking, and sedentary lifestyle greatly increases your risk for illness    A healthy diet, regular physical exercise & weight monitoring are important for maintaining a healthy lifestyle    You may be retaining fluid if you have a history of heart failure or if you experience any of the following symptoms:  Weight gain of 3 pounds or more overnight or 5 pounds in a week, increased swelling in our hands or feet or shortness of breath while lying flat in bed. Please call your doctor as soon as you notice any of these symptoms; do not wait until your next office visit. The discharge information has been reviewed with the patient. The patient verbalized understanding.   Discharge medications reviewed with the patient and appropriate educational materials and side effects teaching were provided. Patient armband removed and shredded  ___________________________________________________________________________________________________________________________________     [place] Node Biopsy for Breast Cancer: What to Expect at [address] [place] [address]  After a sentinel node biopsy, many people have no side effects. Some people have pain or bruising at the cut (incision) and feel tired. Your breast and underarm area may be slightly swollen. This may last a few days. You should feel close to normal in a few days. The incision the doctor made usually heals in about 2 weeks. The scar usually fades with time. Some people have a buildup of fluid in the area where the lymph nodes were removed. This is known as seroma. This goes away on its own, or your doctor can drain it. When you had this test, your doctor injected blue dye or radioactive material (or both) into your breast. The blue dye may give your breast a bluish color and turn your urine green for about 24 hours. The radioactive material leaves the body on its own in 24 to 48 hours. A sentinel node biopsy may be done at the same time as other breast surgeries. If this is the case, how you recover will be different. This care sheet gives you a general idea about how long it will take for you to recover. But each person recovers at a different pace. Follow the steps below to get better as quickly as possible. How can you care for yourself at home? Activity    · Rest when you feel tired. Getting enough sleep will help you recover.     · Try to walk each day. Start by walking a little more than you did the day before. Bit by bit, increase the amount you walk. Walking boosts blood flow and helps prevent pneumonia and constipation.     · You may drive when you are no longer taking pain medicine and you feel up to it.     · You can lift things when you feel comfortable doing so.     · Most women return to work and their normal routines in 2 to 7 days.   · You may shower 24 to 48 hours after surgery, if your doctor okays it. Pat the incision dry. Do not take a bath for the first 2 weeks, or until your doctor tells you it is okay.     · Avoid activity or exercise that may put stress on the cut. This includes washing windows, vacuuming, or gardening with the affected arm. Diet    · You can eat your normal diet. If your stomach is upset, try bland, low-fat foods like plain rice, broiled chicken, toast, and yogurt.     · You may notice that your bowels are not regular right after your surgery. This is common. Try to avoid constipation and straining with bowel movements. Take a fiber supplement such as Citrucel or Metamucil every day. If you have not had a bowel movement after a couple of days, take a mild laxative. Medicines    · Your doctor will tell you if and when you can restart your medicines. He or she will also give you instructions about taking any new medicines.     · If you take aspirin or some other blood thinner, ask your doctor if and when to start taking it again. Make sure that you understand exactly what your doctor wants you to do.     · Take pain medicines exactly as directed. ? If the doctor gave you a prescription medicine for pain, take it as prescribed. ? If you are not taking a prescription pain medicine, take an over-the-counter medicine such as acetaminophen (Tylenol), ibuprofen (Advil, Motrin), or naproxen (Aleve). Read and follow all instructions on the label. ? Do not take two or more pain medicines at the same time unless the doctor told you to. Many pain medicines have acetaminophen, which is Tylenol. Too much acetaminophen (Tylenol) can be harmful.     · If your doctor prescribed antibiotics, take them as directed. Do not stop taking them just because you feel better.  You need to take the full course of antibiotics.     · If you think your pain medicine is making you sick to your stomach:  ? Take your medicine after meals (unless your doctor has told you not to). ? Ask your doctor for a different pain medicine. Incision care    · If you have strips of tape on the cut (incision) the doctor made, leave the tape on for about 1 week or until it falls off.     · After you can shower, wash the area daily with warm, soapy water and pat it dry. Follow-up care is a key part of your treatment and safety. Be sure to make and go to all appointments, and call your doctor if you are having problems. It's also a good idea to know your test results and keep a list of the medicines you take. When should you call for help? Call 911 anytime you think you may need emergency care. For example, call if:    · You passed out (lost consciousness).     · You have chest pain, are short of breath, or cough up blood. Call your doctor now or seek immediate medical care if:    · You have pain that does not get better after you take pain medicine.     · You cannot pass stools or gas.     · You are sick to your stomach or cannot drink fluids.     · You have signs of a blood clot in your leg (called a deep vein thrombosis), such as:  ? Pain in your calf, back of the knee, thigh, or groin. ? Redness or swelling in your leg.     · You have signs of infection, such as:  ? Increased pain, swelling, warmth, or redness. ? Red streaks leading from the incision. ? Pus draining from the incision. ? A fever.     · You have loose stitches, or your incision comes open.     · Bright red blood has soaked through the bandage over your incision. Watch closely for changes in your health, and be sure to contact your doctor if:    · You have any problems.     · You have new or worse swelling or pain in your arm. Where can you learn more? Go to http://www.[employer].com/  Enter H004 in the search box to learn more about \"[place] Node Biopsy for Breast Cancer: What to Expect at Home. \"  Current as of: April 29, 2020               Content Version: 12.6  © 8618-4866 [employer]wise, Incorporated. Care instructions adapted under license by [employer] (which disclaims liability or warranty for this information). If you have questions about a medical condition or this instruction, always ask your healthcare professional. [address] any warranty or liability for your use of this information.        .Patient armband removed and shredded

## 2021-01-06 ENCOUNTER — TELEPHONE (OUTPATIENT)
Dept: SURGERY | Age: 36
End: 2021-01-06

## 2021-01-06 RX ORDER — ONDANSETRON 8 MG/1
8 TABLET, ORALLY DISINTEGRATING ORAL
Qty: 10 TAB | Refills: 0 | Status: SHIPPED | OUTPATIENT
Start: 2021-01-06 | End: 2021-05-27

## 2021-01-06 NOTE — TELEPHONE ENCOUNTER
Zofran order placed to patient's pharmacy per VO of Dr. Srikanth Rivera. Zofran 8 mg PO every 8 hours PRN nausea. Dispense 10, zero refills.

## 2021-01-08 ENCOUNTER — TELEPHONE (OUTPATIENT)
Dept: SURGERY | Age: 36
End: 2021-01-08

## 2021-01-08 NOTE — TELEPHONE ENCOUNTER
Spoke with patient per Dr. Curly Bella to let her know there was no evidence of cancer. Dr. Curly Bella will discuss the results in detail at her follow up appointment. Patient verbalized understanding.     ----- Message from Ottoniel Kelly MD sent at 1/8/2021 10:48 AM EST -----  Please let her know there was no evidence of  cancer. We will discuss the results in detail at her follow up appointment.   Thank you    ----- Message -----  From: [facility] Results  Sent: 1/8/2021  10:11 AM EST  To: Ottoniel Kelly MD

## 2021-01-11 ENCOUNTER — OFFICE VISIT (OUTPATIENT)
Dept: SURGERY | Age: 36
End: 2021-01-11
Payer: COMMERCIAL

## 2021-01-11 VITALS
DIASTOLIC BLOOD PRESSURE: 69 MMHG | HEART RATE: 81 BPM | HEIGHT: 63 IN | BODY MASS INDEX: 22.68 KG/M2 | RESPIRATION RATE: 20 BRPM | TEMPERATURE: 97.7 F | WEIGHT: 128 LBS | SYSTOLIC BLOOD PRESSURE: 109 MMHG

## 2021-01-11 DIAGNOSIS — N60.99 ATYPICAL DUCTAL HYPERPLASIA OF BREAST: Primary | ICD-10-CM

## 2021-01-11 PROCEDURE — 99024 POSTOP FOLLOW-UP VISIT: CPT | Performed by: SURGERY

## 2021-01-11 NOTE — LETTER
1/11/2021 8:45 AM 
 
Patient:  Allison Polanco YOB: 1985 Date of Visit: 1/11/2021 Elaina Hall MD 
[address] [facility] [address]  [phone] Via Fax: [phone] Dear Elaina Hall MD, 
 
 
I had the pleasure of seeing Ms. Chandrika Ann in my office today for her breast mass. I am including a copy of my office visit today. If you have questions, please do not hesitate to call me. I look forward to following Ms. Benitez Odom along with you and will keep you updated as to her progress. Sincerely, Greta Abreu MD

## 2021-01-11 NOTE — PROGRESS NOTES
Breast Mass       Ms. 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 I initially saw her when she was 38 weeks pregnant for breast mass. It is located in the upper outer quadrant. She initially noticed the mass around 2019. It has continued to increased in size. It is bothersome. She denied history of similar symptoms previously. She reports induced nipple discharge, which she also experienced during a previous pregnancy. We discussed excision, but elected to wait until she was no longer pregnant. First pregnancy was achieved with IVF, followed by second spontaneous pregnancy. They have one frozen embryo and are debating implantation timing. Breast/GYN history:    Past Medical History:   Diagnosis Date    Breech presentation 2017    Infertility, female     Polycystic disease, ovaries        Past Surgical History:   Procedure Laterality Date    HX BREAST BIOPSY Right 2021    Right Breast Excisional Biopsy performed by Jalen Negrete MD at [facility] MAIN OR    HX  SECTION      HX ORTHOPAEDIC      tumor removed from right thumb    HX WISDOM TEETH EXTRACTION         Current Outpatient Medications on File Prior to Visit   Medication Sig Dispense Refill    PRENATAL VIT/IRON FUM/FOLIC AC (PRENATAL 1+1 PO) Take  by mouth.  ondansetron (ZOFRAN ODT) 8 mg disintegrating tablet Take 1 Tab by mouth every eight (8) hours as needed for Nausea or Nausea or Vomiting. Indications: prevent nausea and vomiting after surgery 10 Tab 0     No current facility-administered medications on file prior to visit. Allergies   Allergen Reactions    Ceclor [Cefaclor] Rash       Social History     Tobacco Use    Smoking status: Never Smoker    Smokeless tobacco: Never Used   Substance Use Topics    Alcohol use: Yes     Comment: occas    Drug use: No       No family history on file.       ROS:   Positives are bolded  General: fevers, chills, night sweats, fatigue, weight loss, weight gain  GI: nausea, vomiting, abdominal pain, change in bowel habits, hematochezia, melena, GERD  Integ: dermatitis, abnormal moles  HEENT: visual changes, vertigo, epistaxis, dysphagia, hoarseness  Cardiac: chest pain, palpitations, HTN, edema, varicosities  Resp: cough, shortness of breath, wheezing, hemoptysis, snoring, reactive airway disease  : hematuria, dysuria, frequency, urgency, nocturia, stress urinary incontinence   MSK: weakness, joint pain, arthritis  Endocrine: diabetes, thyroid disease, polyuria, polydipsia, polyphagia, poor wound healing, heat intolerance, cold intolerance  Lymph/Heme: anemia, bruising, history of blood transfusions  Neuro: dizziness, headache, fainting, seizures, stroke  Psych: anxiety, depression    Physical Exam:  Visit Vitals  /69   Pulse 81   Temp 97.7 °F (36.5 °C) (Skin)   Resp 20   Ht 5' 3\" (1.6 m)   Wt 58.1 kg (128 lb)   LMP 12/15/2020   BMI 22.67 kg/m²       Gen: No distress  Head: Normocephalic, atraumatic  Mouth: Clear, no overt lesions, oral mucosa pink and moist  Neck: Supple, no masses, no adenopathy, trachea midline  Resp: Clear bilaterally  Cardio: Regular rate and rhythm  Abdomen: soft, nontender, nondistended  Extremeties: warm, well-perfused  Neuro: sensation and strength grossly intact and symmetrical  Psych: alert and oriented to person, place and time  Breasts:  Right: Examined in both the supine and upright positions. There was no supraclavicular, infraclavicular, or axillary lympadenopathy. There were no dominant masses, no skin changes, no asymmetry identified smooth sub centimeter nodule upper outer no longer appreciated, incision clean, ecchymosis  Left:  Examined in both the supine and upright positions. There was no supraclavicular, infraclavicular, or axillary lympadenopathy.    There were no dominant masses, no skin changes, no asymmetry identified       Imagin20 bilateral mammogram /right ultrasound ([facility])  BIRADS 4    3/29/19 right breast ultrasound ([facility])  The palpable finding in the right breast 9 to 930 position likely represents a tiny sebaceous cyst  BIRADS 2    19 right breast ultrasound ([facility])  Normal breast tissue is not seen in the area of the palpable lump in the right breast.  No intraductal mass  BIRADS 1    Pathology:  21   RIGHT BREAST MASS, EXCISIONAL BIOPSY:   ATYPICAL PAPILLOMA (1.0 CM) IN A BACKGROUND OF PROLIFERATIVE FIBROCYSTIC CHANGES. Impression:  Patient Active Problem List   Diagnosis Code    Postpartum care following  delivery Z39.2    Breast mass, right N63.10    History of  delivery affecting pregnancy O34.219    Atypical ductal hyperplasia of breast N60.99         31year old woman with a right atypical papilloma, s/p excision 21. We discussed the results of atypical papilloma/ papilloma with atypical ductal hyperplasia (ADH) in detail. We discussed that generally ADH is not considered cancer or pre-cancer, but rather a high risk marker. A woman with ADH may have a higher than average risk of developing breast cancer. We discussed that treatment can range from observation alone to medication. Removal of the breasts (mastectomy) is not needed or recommended for ADH alone. When diagnosed on a core biopsy, excision is generally recommended to ensure removal of abnormal cells as well as to avoid undersampling of ADH associated with cancer. Ms. Akua Espinoza has already had excisional biopsy. We discussed medical oncology referral for risk reduction. She will consider and we will discuss further at her next visit. Follow up 1 month. She was asked to call with any questions or concerns.

## 2021-01-11 NOTE — PROGRESS NOTES
Patient presents for post op right atypical papilloma, s/p excision 1/5/21. 1. Have you been to the ER, urgent care clinic since your last visit? Hospitalized since your last visit? No    2. Have you seen or consulted any other health care providers outside of the [address] [place] [address] since your last visit? Include any pap smears or colon screening.  No

## 2021-02-11 NOTE — PROGRESS NOTES
Breast Mass       Ms. Serina Plunkett is a 31year old woman with a right atypical papilloma, s/p excision 21. I initially saw her when she was 38 weeks pregnant for breast mass. It is located in the upper outer quadrant. She initially noticed the mass around 2019. It has continued to increased in size. It is bothersome. She denied history of similar symptoms previously. She reports induced nipple discharge, which she also experienced during a previous pregnancy. We discussed excision, but elected to wait until she was no longer pregnant. First pregnancy was achieved with IVF, followed by second spontaneous pregnancy. They have one frozen embryo and are debating implantation timing. Breast/GYN history:    Past Medical History:   Diagnosis Date    Breech presentation 2017    Infertility, female     Polycystic disease, ovaries        Past Surgical History:   Procedure Laterality Date    HX BREAST BIOPSY Right 2021    Right Breast Excisional Biopsy performed by Chandu Mcgrath MD at [facility] MAIN OR    HX  SECTION      HX ORTHOPAEDIC      tumor removed from right thumb    HX WISDOM TEETH EXTRACTION         Current Outpatient Medications on File Prior to Visit   Medication Sig Dispense Refill    ondansetron (ZOFRAN ODT) 8 mg disintegrating tablet Take 1 Tab by mouth every eight (8) hours as needed for Nausea or Nausea or Vomiting. Indications: prevent nausea and vomiting after surgery 10 Tab 0    PRENATAL VIT/IRON FUM/FOLIC AC (PRENATAL 1+1 PO) Take  by mouth. No current facility-administered medications on file prior to visit. Allergies   Allergen Reactions    Ceclor [Cefaclor] Rash       Social History     Tobacco Use    Smoking status: Never Smoker    Smokeless tobacco: Never Used   Substance Use Topics    Alcohol use: Yes     Comment: occas    Drug use: No       No family history on file.       ROS:   Positives are bolded  General: fevers, chills, night sweats, fatigue, weight loss, weight gain  GI: nausea, vomiting, abdominal pain, change in bowel habits, hematochezia, melena, GERD  Integ: dermatitis, abnormal moles  HEENT: visual changes, vertigo, epistaxis, dysphagia, hoarseness  Cardiac: chest pain, palpitations, HTN, edema, varicosities  Resp: cough, shortness of breath, wheezing, hemoptysis, snoring, reactive airway disease  : hematuria, dysuria, frequency, urgency, nocturia, stress urinary incontinence   MSK: weakness, joint pain, arthritis  Endocrine: diabetes, thyroid disease, polyuria, polydipsia, polyphagia, poor wound healing, heat intolerance, cold intolerance  Lymph/Heme: anemia, bruising, history of blood transfusions  Neuro: dizziness, headache, fainting, seizures, stroke  Psych: anxiety, depression    Physical Exam:  Visit Vitals  BP (!) 111/58 (BP 1 Location: Right arm, BP Patient Position: Sitting)   Pulse 67   Temp 97.1 °F (36.2 °C)   Ht 5' 3\" (1.6 m)   Wt 59 kg (130 lb)   SpO2 98%   BMI 23.03 kg/m²       Gen: No distress  Head: Normocephalic, atraumatic  Mouth: Clear, no overt lesions, oral mucosa pink and moist  Neck: Supple, no masses, no adenopathy, trachea midline  Resp: Clear bilaterally  Cardio: Regular rate and rhythm  Abdomen: soft, nontender, nondistended  Extremeties: warm, well-perfused  Neuro: sensation and strength grossly intact and symmetrical  Psych: alert and oriented to person, place and time  Breasts:  Right: Examined in both the supine and upright positions. There was no supraclavicular, infraclavicular, or axillary lympadenopathy. There were no dominant masses, no skin changes, no asymmetry identified smooth sub centimeter nodule upper outer no longer appreciated, incision clean, healing well  Left:  Examined in both the supine and upright positions. There was no supraclavicular, infraclavicular, or axillary lympadenopathy.    There were no dominant masses, no skin changes, no asymmetry identified Imagin20 bilateral mammogram /right ultrasound ([facility])  BIRADS 4    3/29/19 right breast ultrasound ([facility])  The palpable finding in the right breast 9 to 930 position likely represents a tiny sebaceous cyst  BIRADS 2    19 right breast ultrasound ([facility])  Normal breast tissue is not seen in the area of the palpable lump in the right breast.  No intraductal mass  BIRADS 1    Pathology:  21   RIGHT BREAST MASS, EXCISIONAL BIOPSY:   ATYPICAL PAPILLOMA (1.0 CM) IN A BACKGROUND OF PROLIFERATIVE FIBROCYSTIC CHANGES. Impression:  Patient Active Problem List   Diagnosis Code    Postpartum care following  delivery Z39.2    Breast mass, right N63.10    History of  delivery affecting pregnancy O34.219    Atypical ductal hyperplasia of breast N60.99         31year old woman with a right atypical papilloma, s/p excision 21. We discussed the results of atypical papilloma/ papilloma with atypical ductal hyperplasia (ADH) in detail. We discussed that generally ADH is not considered cancer or pre-cancer, but rather a high risk marker. A woman with ADH may have a higher than average risk of developing breast cancer. We discussed that treatment can range from observation alone to medication. Removal of the breasts (mastectomy) is not needed or recommended for ADH alone. When diagnosed on a core biopsy, excision is generally recommended to ensure removal of abnormal cells as well as to avoid undersampling of ADH associated with cancer. Ms. Sue Gerardo has already had excisional biopsy. We discussed medical oncology referral for risk reduction. She is interested in referral. Follow up 6 months. She was asked to call with any questions or concerns.

## 2021-02-15 ENCOUNTER — OFFICE VISIT (OUTPATIENT)
Dept: SURGERY | Age: 36
End: 2021-02-15
Payer: COMMERCIAL

## 2021-02-15 VITALS
OXYGEN SATURATION: 98 % | TEMPERATURE: 97.1 F | DIASTOLIC BLOOD PRESSURE: 58 MMHG | HEART RATE: 67 BPM | WEIGHT: 130 LBS | BODY MASS INDEX: 23.04 KG/M2 | HEIGHT: 63 IN | SYSTOLIC BLOOD PRESSURE: 111 MMHG

## 2021-02-15 DIAGNOSIS — N60.99 ATYPICAL DUCTAL HYPERPLASIA OF BREAST: Primary | ICD-10-CM

## 2021-02-15 PROCEDURE — 99024 POSTOP FOLLOW-UP VISIT: CPT | Performed by: SURGERY

## 2021-02-15 NOTE — PROGRESS NOTES
Tammi Scheuermann is a 31 y.o. female (: 1985) presenting to address:    Chief Complaint   Patient presents with    Surgical Follow-up     Right breast mass excision 21       Medication list and allergies have been reviewed with Neva Manoharivet and updated as of today's date. I have gone over all Medical, Surgical and Social History with Tammi Scheuermann and updated/added the information accordingly. 1. Have you been to the ER, Urgent Care or Hospitalized since your last visit? NO      2. Have you followed up with your PCP or any other Physicians since your procedure/ last office visit?    YES

## 2021-02-15 NOTE — LETTER
2/15/2021 9:41 AM 
 
Patient:  Eduard Talbot YOB: 1985 Date of Visit: 2/15/2021 Sierra Clemons MD 
 [address] [facility] [place] [address] [address] [place] [id number] Via Fax: [phone] Dear Sierra Clemons MD, 
 
 
I had the pleasure of seeing Ms. Dom Marc in my office today for her atypical papilloma. I am including a copy of my office visit today. If you have questions, please do not hesitate to call me. I look forward to following Ms. Ragini Thao along with you and will keep you updated as to her progress. Sincerely, Shonna Elmore MD

## 2021-02-19 ENCOUNTER — TELEPHONE (OUTPATIENT)
Dept: SURGERY | Age: 36
End: 2021-02-19

## 2021-02-19 NOTE — TELEPHONE ENCOUNTER
Received call from [facility] to inform Dr. Madai Light that they have been unable to reach this patient to schedule an appointment with Dr. Lei Reyna after 3 attempts. Will notify Dr. Madai Light.

## 2021-06-25 ENCOUNTER — HOSPITAL ENCOUNTER (OUTPATIENT)
Dept: LAB | Age: 36
Discharge: HOME | End: 2021-06-25
Payer: COMMERCIAL

## 2021-06-25 PROCEDURE — 87624 HPV HI-RISK TYP POOLED RSLT: CPT

## 2021-06-30 LAB
CYTOLOGIST CVX/VAG CYTO: NORMAL
CYTOLOGY CVX/VAG DOC THIN PREP: NORMAL
HPV APTIMA: NEGATIVE
Lab: NORMAL
PATH REPORT.FINAL DX SPEC: NORMAL
STAT OF ADQ CVX/VAG CYTO-IMP: NORMAL

## 2021-07-22 PROBLEM — U07.1 COVID-19: Status: ACTIVE | Noted: 2020-12-02

## 2021-07-22 PROBLEM — Z86.16 HISTORY OF COVID-19: Status: ACTIVE | Noted: 2020-12-08

## 2021-07-22 PROBLEM — Z23 INFLUENZA VACCINATION GIVEN: Status: ACTIVE | Noted: 2019-10-17

## 2021-07-22 PROBLEM — D24.2 PAPILLOMA OF LEFT BREAST: Status: ACTIVE | Noted: 2020-12-29

## 2022-03-18 PROBLEM — N63.10 BREAST MASS, RIGHT: Status: ACTIVE | Noted: 2019-08-29

## 2022-03-19 PROBLEM — Z86.16 HISTORY OF COVID-19: Status: ACTIVE | Noted: 2020-12-08

## 2022-03-19 PROBLEM — Z23 INFLUENZA VACCINATION GIVEN: Status: ACTIVE | Noted: 2019-10-17

## 2022-03-19 PROBLEM — O34.219 HISTORY OF CESAREAN DELIVERY AFFECTING PREGNANCY: Status: ACTIVE | Noted: 2019-09-05

## 2022-03-20 PROBLEM — D24.2 PAPILLOMA OF LEFT BREAST: Status: ACTIVE | Noted: 2020-12-29

## 2022-03-20 PROBLEM — N60.99 ATYPICAL DUCTAL HYPERPLASIA OF BREAST: Status: ACTIVE | Noted: 2021-01-11

## 2022-03-20 PROBLEM — U07.1 COVID-19: Status: ACTIVE | Noted: 2020-12-02

## 2022-06-27 ENCOUNTER — HOSPITAL ENCOUNTER (OUTPATIENT)
Dept: LAB | Age: 37
Discharge: HOME | End: 2022-06-27
Payer: COMMERCIAL

## 2022-06-27 PROCEDURE — 88175 CYTOPATH C/V AUTO FLUID REDO: CPT

## 2022-06-27 PROCEDURE — 87624 HPV HI-RISK TYP POOLED RSLT: CPT

## 2022-12-21 ENCOUNTER — OFFICE VISIT (OUTPATIENT)
Dept: CARDIOLOGY CLINIC | Age: 37
End: 2022-12-21
Payer: COMMERCIAL

## 2022-12-21 VITALS
OXYGEN SATURATION: 98 % | TEMPERATURE: 98.1 F | SYSTOLIC BLOOD PRESSURE: 98 MMHG | DIASTOLIC BLOOD PRESSURE: 62 MMHG | HEART RATE: 104 BPM | WEIGHT: 154.6 LBS | BODY MASS INDEX: 27.39 KG/M2

## 2022-12-21 DIAGNOSIS — R00.0 TACHYCARDIA: Primary | ICD-10-CM

## 2022-12-21 RX ORDER — LANOLIN ALCOHOL/MO/W.PET/CERES
CREAM (GRAM) TOPICAL
COMMUNITY

## 2022-12-21 RX ORDER — DOXYLAMINE SUCCINATE AND PYRIDOXINE HYDROCHLORIDE 20; 20 MG/1; MG/1
1 TABLET, EXTENDED RELEASE ORAL
COMMUNITY

## 2022-12-21 NOTE — PROGRESS NOTES
[facility]    Ms. Eloy Waite is 72-year-old female    Patient is here today for cardiac evaluation. She denies prior history of MI or CHF  Patient was sent to me from OB/GYN clinic for evaluation for tachycardia. Patient is 31-week pregnant. For last 1 month patient has been experiencing some rapid heartbeat which ranges at rest between  bpm.  With exercise or activity it rate goes up to 120-125 beats per minute. She denies any chest pain or chest tightness. She tells me that she also has anemia and now on iron pill. She denies presyncope or syncope. She has mild dyspnea with exertional activity. She denies any swelling. She denies any other specific cardiac complaint. Denies any nausea, vomiting, abdominal pain, fever, chills, sputum production. No hematuria or other bleeding complaints    Past Medical History:   Diagnosis Date    Breech presentation 2017    Infertility, female     Polycystic disease, ovaries        Review of Systems:  Cardiac symptoms as noted above in HPI. All others negative. Denies fatigue, malaise, skin rash, joint pain, blurring vision, photophobia, neck pain, hemoptysis, chronic cough, nausea, vomiting, hematuria, burning micturition, BRBPR, chronic headaches. Current Outpatient Medications   Medication Sig    doxylamine-pyridoxine, vit B6, (Bonjesta) 20-20 mg TbID Take 1 Tablet by mouth nightly. ferrous sulfate (Iron) 325 mg (65 mg iron) tablet Take  by mouth Daily (before breakfast). PRENATAL VIT/IRON FUM/FOLIC AC (PRENATAL 1+1 PO) Take  by mouth. No current facility-administered medications for this visit.        Past Surgical History:   Procedure Laterality Date    HX BREAST BIOPSY Right 2021    Right Breast Excisional Biopsy performed by Kiki Jerez MD at [address] [place] [address] MAIN OR    HX  SECTION      HX ORTHOPAEDIC      tumor removed from right thumb    HX WISDOM TEETH EXTRACTION  2005       Allergies and Sensitivities:  Allergies   Allergen Reactions    Ceclor [Cefaclor] Rash       Family History:  No family history on file. Social History:  Social History     Tobacco Use    Smoking status: Never    Smokeless tobacco: Never   Vaping Use    Vaping Use: Never used   Substance Use Topics    Alcohol use: Yes     Comment: occas    Drug use: No     She  reports that she has never smoked. She has never used smokeless tobacco.  She  reports current alcohol use. Physical Exam:  BP Readings from Last 3 Encounters:   12/21/22 98/62   02/15/21 (!) 111/58   01/11/21 109/69         Pulse Readings from Last 3 Encounters:   12/21/22 (!) 104   02/15/21 67   01/11/21 81          Wt Readings from Last 3 Encounters:   12/21/22 70.1 kg (154 lb 9.6 oz)   07/22/21 54.4 kg (120 lb)   05/27/21 59 kg (130 lb)       Constitutional: Oriented to person, place, and time. HENT: Head: Normocephalic and atraumatic. Eyes: Conjunctivae and extraocular motions are normal.   Neck: No JVD present. Carotid bruit is not appreciated. Cardiovascular: Regular rhythm. No murmur, gallop or rubs appreciated  Lung: Breath sounds normal. No respiratory distress. No ronchi or rales appreciated  Abdominal: No tenderness. No rebound and no guarding. Musculoskeletal: There is no lower extremity edema. No cynosis  Lymphadenopathy:  No cervical or supraclavicular adenopathy appriciated. Neurological: No gross motor deficit noted. Skin: No visible skin rash noted. No Ear discharge noted  Psychiatric: Normal mood and affect. LABS:   @  Lab Results   Component Value Date/Time    WBC 12.5 09/05/2019 04:45 PM    HGB 10.2 (L) 09/06/2019 07:05 AM    HCT 31.3 (L) 09/06/2019 07:05 AM    PLATELET 827 87/99/2262 04:45 PM    MCV 93.5 09/05/2019 04:45 PM     No results found for: NA, K, CL, CO2, GLU, BUN, CREA  No flowsheet data found.   No results found for: ALT  No results found for: HBA1C, VML1MQFU, UYA8BJYM  No results found for: TSH, TSH2, TSH3, TSHP, TSHEXT    EK2022: Sinus tachycardia at 104 bpm.  Normal FL and QRS interval.  No ST changes of ischemia. STRESS TEST (EST, PHARM, NUC, ECHO etc)    CATHETERIZATION    IMPRESSION & PLAN:    Tachycardia:  In regards to tachycardia, this appears benign without any associated presyncope or syncope. Will place event monitor to check heart rate variability. We will proceed with echocardiogram to rule out structural abnormality of the heart. If there is no recent TSH checked by OB/GYN team, would recommend that she check TSH. In absence of any presyncope or syncope or any palpitation, would like to follow-up as of now. According to patient, TSH was checked by PCP and OB/GYN team and has been within separable range  Have asked patient to keep her self well-hydrated. Importance of diet and exercise was discussed with patient. Currently patient denies any symptom that is concerning for angina or heart failure    This plan was discussed with patient who is in agreement. Thank you for allowing me to participate in patient care. Please feel free to call me if you have any question or concern. Maria Elena Higuera MD  Please note: This document has been produced using voice recognition software. Unrecognized errors in transcription may be present.

## 2022-12-21 NOTE — PATIENT INSTRUCTIONS
Testing   Echo**call office 3-5 days after testing is completed for results**       Other Testing  [employer]( 30) -will be calling you to confirm your address to send your Heart Monitor. Please allow 7-10 business days for monitor to arrive at your home.   Customer Service for [employer]:  [phone]      New Location Address- projected for the month of February 2023    [address] [facility] [address], [place] [address]

## 2022-12-21 NOTE — PROGRESS NOTES
Identified pt with two pt identifiers(name and ). Reviewed record in preparation for visit and have obtained necessary documentation. Sofía Rodriguez presents today for   Chief Complaint   Patient presents with    New Patient     [facility]        Pt c/o SOB and elevated HR's. Sofía Rodriguez preferred language for health care discussion is english/other. Personal Protective Equipment:   Personal Protective Equipment was used including: mask-surgical and hands-gloves. Patient was placed on no precaution(s). Patient was masked. Precautions:   Patient currently on None  Patient currently roomed with door closed. Is someone accompanying this pt? no    Is the patient using any DME equipment during OV? no    Depression Screening:  No flowsheet data found. Learning Assessment:  No flowsheet data found. Abuse Screening:  Abuse Screening Questionnaire 2022   Do you ever feel afraid of your partner? N   Are you in a relationship with someone who physically or mentally threatens you? N   Is it safe for you to go home? Y       Fall Risk  No flowsheet data found. Pt currently taking Anticoagulant therapy? no  Pt currently taking Antiplatelet therapy? no    Coordination of Care:  1. Have you been to the ER, urgent care clinic since your last visit? Hospitalized since your last visit? no    2. Have you seen or consulted any other health care providers outside of the [address] [place] [address] since your last visit? Include any pap smears or colon screening. yes      Please see Red banners under Allergies and Med Rec to remove outside inquires. All correct information has been verified with patient and added to chart.      Medication's patient's would liked removed has been marked not taking to be removed per Verbal order and read back per Mayur Ahmadi MD

## 2022-12-21 NOTE — LETTER
12/21/2022    Patient: Keli Trujillo   YOB: 1985   Date of Visit: 12/21/2022     James Holden MD  [address], [address] [facility] [address]  Via Fax: [phone]    Dear James Holden MD,      Thank you for referring Ms. Keli Trujillo to [facility] for evaluation. My notes for this consultation are attached. If you have questions, please do not hesitate to call me. I look forward to following your patient along with you.       Sincerely,    Jaci Mendez MD

## 2023-01-11 ENCOUNTER — TELEPHONE (OUTPATIENT)
Dept: CARDIOLOGY CLINIC | Age: 38
End: 2023-01-11

## 2023-02-06 ENCOUNTER — TELEPHONE (OUTPATIENT)
Dept: CARDIOLOGY CLINIC | Age: 38
End: 2023-02-06

## 2023-02-06 NOTE — TELEPHONE ENCOUNTER
----- Message from Melissa Adair MD sent at 2/6/2023  7:50 AM EST -----  Please inform patient about test result  Appears normal.    Thanks  SP
Contacted pt at home #. No answer. LVM on secure line for PT normal heart monitor results. Advised to call the office if any  questions.
Unknown to patient

## 2023-06-28 ENCOUNTER — HOSPITAL ENCOUNTER (OUTPATIENT)
Facility: HOSPITAL | Age: 38
Setting detail: SPECIMEN
Discharge: HOME | End: 2023-07-01
Payer: COMMERCIAL

## 2023-06-28 PROCEDURE — 88175 CYTOPATH C/V AUTO FLUID REDO: CPT

## 2023-06-28 PROCEDURE — 87624 HPV HI-RISK TYP POOLED RSLT: CPT

## 2023-10-26 ENCOUNTER — TELEPHONE (OUTPATIENT)
Age: 38
End: 2023-10-26

## 2023-12-24 NOTE — PROGRESS NOTES
Bedside and Verbal shift change report given to Felicita Luna RN (oncoming nurse) by Eneida Guajardo RN (offgoing nurse). Report included the following information SBAR, Kardex, Intake/Output, MAR, Recent Results and Med Rec Status. Pneumonia

## (undated) DEVICE — SOL IRR SOD CL 0.9% 1000ML BTL --

## (undated) DEVICE — SPONGE LAP 18X18IN STRL -- 5/PK

## (undated) DEVICE — REM POLYHESIVE ADULT PATIENT RETURN ELECTRODE: Brand: VALLEYLAB

## (undated) DEVICE — STERILE POLYISOPRENE POWDER-FREE SURGICAL GLOVES: Brand: PROTEXIS

## (undated) DEVICE — S/USE RESUS KIT W/O MASK (10): Brand: FISHER & PAYKEL HEALTHCARE

## (undated) DEVICE — KIT PROC PLAS BRST CUST LF --

## (undated) DEVICE — CATH KT SUC CTRL VLV 10FR --

## (undated) DEVICE — SHEET,DRAPE,40X58,STERILE: Brand: MEDLINE

## (undated) DEVICE — INTENDED FOR TISSUE SEPARATION, AND OTHER PROCEDURES THAT REQUIRE A SHARP SURGICAL BLADE TO PUNCTURE OR CUT.: Brand: BARD-PARKER SAFETY BLADES SIZE 10, STERILE

## (undated) DEVICE — YANKAUER,FLEXIBLE HANDLE,REGLR CAPACITY: Brand: MEDLINE INDUSTRIES, INC.

## (undated) DEVICE — TOWEL SURG W16XL26IN BLU NONFENESTRATED DLX ST 2 PER PK

## (undated) DEVICE — SUTURE PLN GUT SZ 2-0 L27IN ABSRB YELLOWISH TAN L70MM XLH 53T

## (undated) DEVICE — ADHESIVE TISS CLOSURE 22X4 CM 4 CC HI VISC EXOFIN

## (undated) DEVICE — SUTURE VCRL SZ 0 L36IN ABSRB VLT L40MM CT 1/2 CIR J358H

## (undated) DEVICE — (D)SYR 10ML 1/5ML GRAD NSAF -- PKGING CHANGE USE ITEM 338027

## (undated) DEVICE — INTENDED FOR TISSUE SEPARATION, AND OTHER PROCEDURES THAT REQUIRE A SHARP SURGICAL BLADE TO PUNCTURE OR CUT.: Brand: BARD-PARKER ® CARBON RIB-BACK BLADES

## (undated) DEVICE — FLEX ADVANTAGE 1500CC: Brand: FLEX ADVANTAGE

## (undated) DEVICE — GOWN,AURORA,FABRIC-REINFORCED,X-LARGE: Brand: MEDLINE

## (undated) DEVICE — (D)PREP SKN CHLRAPRP APPL 26ML -- CONVERT TO ITEM 371833

## (undated) DEVICE — SUTURE MCRYL SZ 4-0 L18IN ABSRB UD L19MM PS-2 3/8 CIR PRIM Y496G

## (undated) DEVICE — SUTURE VCRL SZ 3-0 L27IN ABSRB UD L26MM SH 1/2 CIR J416H

## (undated) DEVICE — TRAY CATH 16FR BLLN 5CC DRNGE BG 2000ML SIL F ANTIREFLX

## (undated) DEVICE — MEDI-VAC NON-CONDUCTIVE SUCTION TUBING 6MM X 6.1M (20 FT.) L: Brand: CARDINAL HEALTH

## (undated) DEVICE — SUTURE MCRYL SZ 0 L36IN ABSRB UD L36MM CT-1 1/2 CIR Y946H

## (undated) DEVICE — SUTURE VCRL SZ 3-0 L27IN ABSRB UD L36MM CT-1 1/2 CIR J258H

## (undated) DEVICE — SUT MONOCRYL PLUS UD 4-0 --

## (undated) DEVICE — SUT SLK 2-0SH 30IN BLK --

## (undated) DEVICE — APPLICATOR BNDG 1MM ADH PREMIERPRO EXOFIN

## (undated) DEVICE — MASK ROUND 60MM FPH (10) S/USE: Brand: FISHER & PAYKEL HEALTHCARE

## (undated) DEVICE — GAUZE,SPONGE,8"X4",12PLY,XRAY,STRL,LF: Brand: MEDLINE

## (undated) DEVICE — SUTURE ABSORBABLE BRAIDED 2-0 CT-1 27 IN UD VICRYL J259H

## (undated) DEVICE — ADHESIVE TISS DERMA FLEX 0.7ML -- HIGH VISCOSITY

## (undated) DEVICE — DERMABOND SKIN ADH 0.7ML -- DERMABOND ADVANCED 12/BX

## (undated) DEVICE — PACK PROCEDURE SURG C SECT DMC